# Patient Record
Sex: MALE | Race: WHITE | Employment: OTHER | URBAN - METROPOLITAN AREA
[De-identification: names, ages, dates, MRNs, and addresses within clinical notes are randomized per-mention and may not be internally consistent; named-entity substitution may affect disease eponyms.]

---

## 2020-09-09 ENCOUNTER — APPOINTMENT (OUTPATIENT)
Dept: GENERAL RADIOLOGY | Age: 85
End: 2020-09-09
Attending: EMERGENCY MEDICINE
Payer: MEDICARE

## 2020-09-09 ENCOUNTER — HOSPITAL ENCOUNTER (EMERGENCY)
Age: 85
Discharge: HOME | End: 2020-09-09
Attending: EMERGENCY MEDICINE
Payer: MEDICARE

## 2020-09-09 VITALS
WEIGHT: 191 LBS | TEMPERATURE: 98.1 F | DIASTOLIC BLOOD PRESSURE: 74 MMHG | HEIGHT: 70 IN | HEART RATE: 71 BPM | BODY MASS INDEX: 27.35 KG/M2 | SYSTOLIC BLOOD PRESSURE: 114 MMHG | RESPIRATION RATE: 16 BRPM | OXYGEN SATURATION: 96 %

## 2020-09-09 DIAGNOSIS — M25.431 PAIN AND SWELLING OF RIGHT WRIST: Primary | ICD-10-CM

## 2020-09-09 DIAGNOSIS — M25.531 PAIN AND SWELLING OF RIGHT WRIST: Primary | ICD-10-CM

## 2020-09-09 PROCEDURE — 73110 X-RAY EXAM OF WRIST: CPT

## 2020-09-09 PROCEDURE — 99284 EMERGENCY DEPT VISIT MOD MDM: CPT

## 2020-09-09 RX ORDER — PREDNISONE 20 MG/1
20 TABLET ORAL DAILY
Qty: 3 TAB | Refills: 0 | Status: SHIPPED | OUTPATIENT
Start: 2020-09-09 | End: 2020-09-14

## 2020-09-09 NOTE — PROGRESS NOTES
SW met with patient, confirmed demographic information. Patient is , lives alone, but his daughter lives down the street. Patient reports being independent at home, does not use assistive devices to ambulate, and denies any falls. Patient is also established with primary care and is seen regularly. No needs identified from SW at this time.      Jesus Leonard, [facility]    [address] [place] [address]    * [email or web address]    ( [phone]

## 2020-09-09 NOTE — ED PROVIDER NOTES
82-year-old gentleman presents with concerns about pain and swelling in his right wrist that he noticed this morning. [place] any specific injury. He says he is had no redness and no fevers. No prior history of similar symptoms. No known history of gout. No other associated symptoms. Elements of this note were created using speech recognition software. As such, errors of speech recognition may be present. Past Medical History:   Diagnosis Date    Cancer [facility])     prostate, bones, thyroid    [facility]     Diabetes ([facility]        Past Surgical History:   Procedure Laterality Date    HX HIP REPLACEMENT Right     HX THYROIDECTOMY           History reviewed. No pertinent family history. Social History     Socioeconomic History    Marital status:      Spouse name: Not on file    Number of children: Not on file    Years of education: Not on file    Highest education level: Not on file   Occupational History    Not on file   Social Needs    Financial resource strain: Not on file    Food insecurity     Worry: Not on file     Inability: Not on file    Transportation needs     Medical: Not on file     Non-medical: Not on file   Tobacco Use    Smoking status: Former Smoker    Smokeless tobacco: Never Used   Substance and Sexual Activity    Alcohol use:  Yes    Drug use: Never    Sexual activity: Not on file   Lifestyle    Physical activity     Days per week: Not on file     Minutes per session: Not on file    Stress: Not on file   Relationships    Social connections     Talks on phone: Not on file     Gets together: Not on file     Attends Roman Catholic service: Not on file     Active member of club or organization: Not on file     Attends meetings of clubs or organizations: Not on file     Relationship status: Not on file    Intimate partner violence     Fear of current or ex partner: Not on file     Emotionally abused: Not on file     Physically abused: Not on file     Forced sexual activity: Not on file   Other Topics Concern    Not on file   Social History Narrative    Not on file         ALLERGIES: Patient has no known allergies. Review of Systems   Constitutional: Negative for chills and fever. Musculoskeletal: Positive for arthralgias and joint swelling. Skin: Negative for color change and rash. Vitals:    09/09/20 1215 09/09/20 1220 09/09/20 1221 09/09/20 1224   BP: 110/62 140/69     Pulse: 60  71    Resp: 16      Temp: 98.1 °F (36.7 °C)      SpO2: 97% 96% 96% 96%   Weight:   86.6 kg (191 lb)    Height:   5' 10\" (1.778 m)             Physical Exam  Vitals signs and nursing note reviewed. Constitutional:       Appearance: Normal appearance. Musculoskeletal:         General: Swelling and tenderness present. No deformity or signs of injury. Neurological:      General: No focal deficit present. Mental Status: He is alert and oriented to person, place, and time. MDM  Number of Diagnoses or Management Options  Diagnosis management comments: I will get an x-ray of his wrist to look for occult bony abnormality. ED Course as of Sep 09 1324   Wed Sep 09, 2020   1321 X-ray shows no acute fracture. He is on Pradaxa so I will not prescribe anti-inflammatories but I will give him a prescription for some steroids.     [AC]      ED Course User Index  [AC] Keegan Davis MD       Procedures

## 2020-09-09 NOTE — ED NOTES
I have reviewed discharge instructions with the patient. The patient verbalized understanding. Patient left ED via Discharge Method: ambulatory to Home with daughter. Opportunity for questions and clarification provided. Patient given 1 scripts. To continue your aftercare when you leave the hospital, you may receive an automated call from our care team to check in on how you are doing. This is a free service and part of our promise to provide the best care and service to meet your aftercare needs.  If you have questions, or wish to unsubscribe from this service please call [phone]. Thank you for Choosing our [facility] Department.

## 2020-09-09 NOTE — ED TRIAGE NOTES
Pt c/o right hand and wrist pain that started this morning when he woke up. Pt daughter stated patient was holding a 2x4 in his right hand hammering. Pt daughter denies pt hitting his hand with hammer but that maybe the pressure caused the pain. Pt with personal mask upon arrival to the ED.

## 2021-04-18 ENCOUNTER — APPOINTMENT (OUTPATIENT)
Dept: CT IMAGING | Age: 86
DRG: 552 | End: 2021-04-18
Attending: EMERGENCY MEDICINE
Payer: MEDICARE

## 2021-04-18 ENCOUNTER — HOSPITAL ENCOUNTER (INPATIENT)
Age: 86
LOS: 1 days | Discharge: HOME | DRG: 552 | End: 2021-04-21
Attending: EMERGENCY MEDICINE | Admitting: INTERNAL MEDICINE
Payer: MEDICARE

## 2021-04-18 DIAGNOSIS — R42 DIZZINESS: Primary | ICD-10-CM

## 2021-04-18 DIAGNOSIS — R32 URINARY INCONTINENCE, UNSPECIFIED TYPE: ICD-10-CM

## 2021-04-18 DIAGNOSIS — R27.0 ATAXIA: ICD-10-CM

## 2021-04-18 PROBLEM — R26.81 UNSTEADY GAIT: Status: ACTIVE | Noted: 2021-04-18

## 2021-04-18 LAB
ANION GAP SERPL CALC-SCNC: 3 MMOL/L (ref 7–16)
APPEARANCE UR: CLEAR
ATRIAL RATE: 138 BPM
BASOPHILS # BLD: 0 K/UL (ref 0–0.2)
BASOPHILS NFR BLD: 1 % (ref 0–2)
BILIRUB UR QL: NEGATIVE
BUN SERPL-MCNC: 17 MG/DL (ref 8–23)
CALCIUM SERPL-MCNC: 9 MG/DL (ref 8.3–10.4)
CALCULATED R AXIS, ECG10: -63 DEGREES
CALCULATED T AXIS, ECG11: 3 DEGREES
CHLORIDE SERPL-SCNC: 108 MMOL/L (ref 98–107)
CO2 SERPL-SCNC: 27 MMOL/L (ref 21–32)
COLOR UR: YELLOW
CREAT SERPL-MCNC: 0.93 MG/DL (ref 0.8–1.5)
DIAGNOSIS, 93000: NORMAL
DIFFERENTIAL METHOD BLD: NORMAL
EOSINOPHIL # BLD: 0.2 K/UL (ref 0–0.8)
EOSINOPHIL NFR BLD: 3 % (ref 0.5–7.8)
ERYTHROCYTE [DISTWIDTH] IN BLOOD BY AUTOMATED COUNT: 13.2 % (ref 11.9–14.6)
GLUCOSE BLD STRIP.AUTO-MCNC: 140 MG/DL (ref 65–100)
GLUCOSE BLD STRIP.AUTO-MCNC: 157 MG/DL (ref 65–100)
GLUCOSE BLD STRIP.AUTO-MCNC: 169 MG/DL (ref 65–100)
GLUCOSE SERPL-MCNC: 153 MG/DL (ref 65–100)
GLUCOSE UR STRIP.AUTO-MCNC: >1000 MG/DL
HCT VFR BLD AUTO: 43.8 % (ref 41.1–50.3)
HGB BLD-MCNC: 14.2 G/DL (ref 13.6–17.2)
HGB UR QL STRIP: NEGATIVE
IMM GRANULOCYTES # BLD AUTO: 0 K/UL (ref 0–0.5)
IMM GRANULOCYTES NFR BLD AUTO: 1 % (ref 0–5)
INR BLD: 1.1 (ref 0.9–1.2)
INR PPP: 1.1
KETONES UR QL STRIP.AUTO: NEGATIVE MG/DL
LEUKOCYTE ESTERASE UR QL STRIP.AUTO: NEGATIVE
LYMPHOCYTES # BLD: 1 K/UL (ref 0.5–4.6)
LYMPHOCYTES NFR BLD: 19 % (ref 13–44)
MCH RBC QN AUTO: 31.3 PG (ref 26.1–32.9)
MCHC RBC AUTO-ENTMCNC: 32.4 G/DL (ref 31.4–35)
MCV RBC AUTO: 96.7 FL (ref 79.6–97.8)
MONOCYTES # BLD: 0.7 K/UL (ref 0.1–1.3)
MONOCYTES NFR BLD: 12 % (ref 4–12)
NEUTS SEG # BLD: 3.5 K/UL (ref 1.7–8.2)
NEUTS SEG NFR BLD: 65 % (ref 43–78)
NITRITE UR QL STRIP.AUTO: NEGATIVE
NRBC # BLD: 0 K/UL (ref 0–0.2)
PH UR STRIP: 6 [PH] (ref 5–9)
PLATELET # BLD AUTO: 199 K/UL (ref 150–450)
PMV BLD AUTO: 9.4 FL (ref 9.4–12.3)
POTASSIUM SERPL-SCNC: 3.8 MMOL/L (ref 3.5–5.1)
PROT UR STRIP-MCNC: NEGATIVE MG/DL
PROTHROMBIN TIME: 14.2 SEC (ref 12.5–14.7)
PT BLD: 12.8 SECS (ref 9.6–11.6)
Q-T INTERVAL, ECG07: 388 MS
QRS DURATION, ECG06: 122 MS
QTC CALCULATION (BEZET), ECG08: 455 MS
RBC # BLD AUTO: 4.53 M/UL (ref 4.23–5.6)
SERVICE CMNT-IMP: ABNORMAL
SODIUM SERPL-SCNC: 138 MMOL/L (ref 138–145)
SP GR UR REFRACTOMETRY: 1.03 (ref 1–1.02)
TROPONIN-HIGH SENSITIVITY: 30.6 PG/ML (ref 0–14)
UROBILINOGEN UR QL STRIP.AUTO: 0.2 EU/DL (ref 0.2–1)
VENTRICULAR RATE, ECG03: 83 BPM
WBC # BLD AUTO: 5.4 K/UL (ref 4.3–11.1)

## 2021-04-18 PROCEDURE — 85610 PROTHROMBIN TIME: CPT

## 2021-04-18 PROCEDURE — 84484 ASSAY OF TROPONIN QUANT: CPT

## 2021-04-18 PROCEDURE — 85025 COMPLETE CBC W/AUTO DIFF WBC: CPT

## 2021-04-18 PROCEDURE — 70450 CT HEAD/BRAIN W/O DYE: CPT

## 2021-04-18 PROCEDURE — 99218 HC RM OBSERVATION: CPT

## 2021-04-18 PROCEDURE — 74011000636 HC RX REV CODE- 636: Performed by: EMERGENCY MEDICINE

## 2021-04-18 PROCEDURE — 74011250637 HC RX REV CODE- 250/637: Performed by: EMERGENCY MEDICINE

## 2021-04-18 PROCEDURE — 74011636637 HC RX REV CODE- 636/637: Performed by: FAMILY MEDICINE

## 2021-04-18 PROCEDURE — 93005 ELECTROCARDIOGRAM TRACING: CPT | Performed by: EMERGENCY MEDICINE

## 2021-04-18 PROCEDURE — 2709999900 HC NON-CHARGEABLE SUPPLY

## 2021-04-18 PROCEDURE — 82962 GLUCOSE BLOOD TEST: CPT

## 2021-04-18 PROCEDURE — 80048 BASIC METABOLIC PNL TOTAL CA: CPT

## 2021-04-18 PROCEDURE — 81003 URINALYSIS AUTO W/O SCOPE: CPT

## 2021-04-18 PROCEDURE — 99285 EMERGENCY DEPT VISIT HI MDM: CPT

## 2021-04-18 PROCEDURE — 70498 CT ANGIOGRAPHY NECK: CPT

## 2021-04-18 PROCEDURE — 4A03X5D MEASUREMENT OF ARTERIAL FLOW, INTRACRANIAL, EXTERNAL APPROACH: ICD-10-PCS | Performed by: RADIOLOGY

## 2021-04-18 PROCEDURE — 0042T CT PERF W CBF: CPT

## 2021-04-18 PROCEDURE — 77030040393 HC DRSG OPTIFOAM GENT MDII -B

## 2021-04-18 PROCEDURE — 74011000258 HC RX REV CODE- 258: Performed by: EMERGENCY MEDICINE

## 2021-04-18 RX ORDER — DICLOFENAC SODIUM 10 MG/G
GEL TOPICAL 4 TIMES DAILY
COMMUNITY

## 2021-04-18 RX ORDER — ASCORBIC ACID 500 MG
500 TABLET ORAL
COMMUNITY

## 2021-04-18 RX ORDER — LEVOTHYROXINE SODIUM 200 UG/1
200 TABLET ORAL
COMMUNITY

## 2021-04-18 RX ORDER — LEVOTHYROXINE SODIUM 100 UG/1
200 TABLET ORAL
Status: DISCONTINUED | OUTPATIENT
Start: 2021-04-19 | End: 2021-04-21 | Stop reason: HOSPADM

## 2021-04-18 RX ORDER — FUROSEMIDE 40 MG/1
40 TABLET ORAL DAILY
Status: DISCONTINUED | OUTPATIENT
Start: 2021-04-19 | End: 2021-04-21 | Stop reason: HOSPADM

## 2021-04-18 RX ORDER — GUAIFENESIN 100 MG/5ML
81 LIQUID (ML) ORAL DAILY
Status: DISCONTINUED | OUTPATIENT
Start: 2021-04-19 | End: 2021-04-18 | Stop reason: SDUPTHER

## 2021-04-18 RX ORDER — METOPROLOL TARTRATE 50 MG/1
50 TABLET ORAL 2 TIMES DAILY
COMMUNITY

## 2021-04-18 RX ORDER — DABIGATRAN ETEXILATE 150 MG/1
150 CAPSULE ORAL EVERY 12 HOURS
Status: CANCELLED | OUTPATIENT
Start: 2021-04-18

## 2021-04-18 RX ORDER — SODIUM CHLORIDE 0.9 % (FLUSH) 0.9 %
5-40 SYRINGE (ML) INJECTION AS NEEDED
Status: DISCONTINUED | OUTPATIENT
Start: 2021-04-18 | End: 2021-04-21 | Stop reason: HOSPADM

## 2021-04-18 RX ORDER — INSULIN LISPRO 100 [IU]/ML
INJECTION, SOLUTION INTRAVENOUS; SUBCUTANEOUS
Status: DISCONTINUED | OUTPATIENT
Start: 2021-04-18 | End: 2021-04-21 | Stop reason: HOSPADM

## 2021-04-18 RX ORDER — SODIUM CHLORIDE 0.9 % (FLUSH) 0.9 %
5-40 SYRINGE (ML) INJECTION EVERY 8 HOURS
Status: DISCONTINUED | OUTPATIENT
Start: 2021-04-18 | End: 2021-04-21 | Stop reason: HOSPADM

## 2021-04-18 RX ORDER — UREA 10 %
100 LOTION (ML) TOPICAL DAILY
COMMUNITY

## 2021-04-18 RX ORDER — OXYBUTYNIN CHLORIDE 10 MG/1
10 TABLET, EXTENDED RELEASE ORAL DAILY
COMMUNITY

## 2021-04-18 RX ORDER — ASCORBIC ACID 500 MG
500 TABLET ORAL DAILY
Status: DISCONTINUED | OUTPATIENT
Start: 2021-04-19 | End: 2021-04-21 | Stop reason: HOSPADM

## 2021-04-18 RX ORDER — SODIUM CHLORIDE 0.9 % (FLUSH) 0.9 %
10 SYRINGE (ML) INJECTION
Status: COMPLETED | OUTPATIENT
Start: 2021-04-18 | End: 2021-04-18

## 2021-04-18 RX ORDER — TURM/GING/BOS/YUC/WIL/CHAM/HOR 100-100 MG
TABLET ORAL
COMMUNITY

## 2021-04-18 RX ORDER — GUAIFENESIN 100 MG/5ML
81 LIQUID (ML) ORAL DAILY
Status: DISCONTINUED | OUTPATIENT
Start: 2021-04-18 | End: 2021-04-21 | Stop reason: HOSPADM

## 2021-04-18 RX ORDER — LABETALOL HYDROCHLORIDE 5 MG/ML
5 INJECTION, SOLUTION INTRAVENOUS
Status: DISCONTINUED | OUTPATIENT
Start: 2021-04-18 | End: 2021-04-21 | Stop reason: HOSPADM

## 2021-04-18 RX ORDER — ATORVASTATIN CALCIUM 40 MG/1
40 TABLET, FILM COATED ORAL DAILY
Status: DISCONTINUED | OUTPATIENT
Start: 2021-04-19 | End: 2021-04-21 | Stop reason: HOSPADM

## 2021-04-18 RX ORDER — BENAZEPRIL HYDROCHLORIDE 20 MG/1
20 TABLET ORAL DAILY
COMMUNITY
End: 2021-04-21

## 2021-04-18 RX ORDER — ATORVASTATIN CALCIUM 40 MG/1
40 TABLET, FILM COATED ORAL DAILY
COMMUNITY

## 2021-04-18 RX ORDER — FUROSEMIDE 40 MG/1
40 TABLET ORAL DAILY
COMMUNITY

## 2021-04-18 RX ORDER — DABIGATRAN ETEXILATE 150 MG/1
150 CAPSULE ORAL EVERY 12 HOURS
COMMUNITY

## 2021-04-18 RX ORDER — GLIPIZIDE 2.5 MG/1
2.5 TABLET, EXTENDED RELEASE ORAL DAILY
COMMUNITY

## 2021-04-18 RX ORDER — METFORMIN HYDROCHLORIDE 500 MG/1
500 TABLET ORAL 2 TIMES DAILY WITH MEALS
COMMUNITY

## 2021-04-18 RX ORDER — MULTIVIT WITH IRON,MINERALS
TABLET,CHEWABLE ORAL
COMMUNITY

## 2021-04-18 RX ORDER — DILTIAZEM HYDROCHLORIDE 240 MG/1
240 CAPSULE, EXTENDED RELEASE ORAL DAILY
COMMUNITY

## 2021-04-18 RX ADMIN — INSULIN LISPRO 2 UNITS: 100 INJECTION, SOLUTION INTRAVENOUS; SUBCUTANEOUS at 17:56

## 2021-04-18 RX ADMIN — ASPIRIN 81 MG: 81 TABLET, CHEWABLE ORAL at 12:34

## 2021-04-18 RX ADMIN — INSULIN LISPRO 2 UNITS: 100 INJECTION, SOLUTION INTRAVENOUS; SUBCUTANEOUS at 21:43

## 2021-04-18 RX ADMIN — SODIUM CHLORIDE 100 ML: 900 INJECTION, SOLUTION INTRAVENOUS at 10:00

## 2021-04-18 RX ADMIN — IOPAMIDOL 100 ML: 755 INJECTION, SOLUTION INTRAVENOUS at 10:00

## 2021-04-18 RX ADMIN — Medication 10 ML: at 21:43

## 2021-04-18 RX ADMIN — Medication 10 ML: at 13:29

## 2021-04-18 RX ADMIN — Medication 10 ML: at 10:00

## 2021-04-18 NOTE — PROGRESS NOTES
Spiritual assessment:      PREFERRED NAME:  GAYATRI    DAUGHTER - MAMADOU    [place]  NO HX OF  VISITS    HX CANCER MULTIPLE SITES WITH METS    BLURRY VISION PRESENTLY    HIGH RISK        Will continue to assess how we can best serve this family.

## 2021-04-18 NOTE — PROGRESS NOTES
04/18/21 1300   Dual Skin Pressure Injury Assessment   Dual Skin Pressure Injury Assessment WDL   Second Care Provider (Based on [address] [place] [address]) Jamel Hutton RN     No pressure injuries noted. Oriented to room and call bell system. Bed low and locked, alarm on, and call bell in reach. Reminded pt to call for any needs, pt verbalized understanding.

## 2021-04-18 NOTE — ED PROVIDER NOTES
72-year-old male with a history of prostate cancer and prostatectomy, atrial fibrillation on Pradaxa, congestive heart failure, diabetes presents with sudden onset dizziness and unsteady gait when he woke up to urinate at 4 AM.  He was normal upon going to bed last night 11 PM.  He reports multiple episodes of urinary incontinence. Denies burning with urination. Also reports blurry vision for the past several days with excessive sleepiness. No headache, chest pain, or shortness of breath. No recent changes in medications. No nausea or vomiting. Patient is falling more to the right. Dizziness  Pertinent negatives include no shortness of breath, no chest pain, no vomiting, no confusion, no headaches and no nausea. Past Medical History:   Diagnosis Date    Cancer [facility])     prostate, bones, thyroid    [facility]     [facility]        Past Surgical History:   Procedure Laterality Date    HX HIP REPLACEMENT Right     HX THYROIDECTOMY           No family history on file. Social History     Socioeconomic History    Marital status:      Spouse name: Not on file    Number of children: Not on file    Years of education: Not on file    Highest education level: Not on file   Occupational History    Not on file   Social Needs    Financial resource strain: Not on file    Food insecurity     Worry: Not on file     Inability: Not on file    Transportation needs     Medical: Not on file     Non-medical: Not on file   Tobacco Use    Smoking status: Former Smoker    Smokeless tobacco: Never Used   Substance and Sexual Activity    Alcohol use:  Yes    Drug use: Never    Sexual activity: Not on file   Lifestyle    Physical activity     Days per week: Not on file     Minutes per session: Not on file    Stress: Not on file   Relationships    Social connections     Talks on phone: Not on file     Gets together: Not on file     Attends Judaism service: Not on file     Active member of club or organization: Not on file     Attends meetings of clubs or organizations: Not on file     Relationship status: Not on file    Intimate partner violence     Fear of current or ex partner: Not on file     Emotionally abused: Not on file     Physically abused: Not on file     Forced sexual activity: Not on file   Other Topics Concern    Not on file   Social History Narrative    Not on file         ALLERGIES: Patient has no known allergies. Review of Systems   Constitutional: Negative for fever. HENT: Negative for hearing loss. Eyes: Positive for visual disturbance. Respiratory: Negative for cough and shortness of breath. Cardiovascular: Negative for chest pain. Gastrointestinal: Negative for abdominal pain, diarrhea, nausea and vomiting. Genitourinary: Positive for difficulty urinating. Musculoskeletal: Negative for back pain. Skin: Negative for rash. Neurological: Positive for dizziness and weakness. Negative for syncope and headaches. Psychiatric/Behavioral: Negative for confusion. All other systems reviewed and are negative. Vitals:    04/18/21 0939   BP: (!) 143/93   Pulse: 65   Resp: 16   Temp: 97.7 °F (36.5 °C)   SpO2: 97%   Weight: 90.7 kg (200 lb)   Height: 5' 10\" (1.778 m)            Physical Exam  Vitals signs and nursing note reviewed. Constitutional:       Appearance: Normal appearance. He is well-developed. HENT:      Head: Normocephalic and atraumatic. Nose: Nose normal.      Mouth/Throat:      Mouth: Mucous membranes are moist.   Eyes:      Pupils: Pupils are equal, round, and reactive to light. Neck:      Musculoskeletal: Normal range of motion and neck supple. Cardiovascular:      Rate and Rhythm: Rhythm irregular. Heart sounds: Normal heart sounds. Pulmonary:      Effort: Pulmonary effort is normal.      Breath sounds: Normal breath sounds. Abdominal:      Palpations: Abdomen is soft. Tenderness:  There is no abdominal tenderness. Musculoskeletal: Normal range of motion. General: No deformity. Skin:     General: Skin is warm and dry. Neurological:      General: No focal deficit present. Mental Status: He is alert and oriented to person, place, and time. Mental status is at baseline. Cranial Nerves: Cranial nerves are intact. Sensory: Sensation is intact. Motor: Motor function is intact. Coordination: Finger-Nose-Finger Test normal. Impaired rapid alternating movements. Gait: Gait abnormal.   Psychiatric:         Mood and Affect: Mood normal.         Behavior: Behavior normal.          MDM  Number of Diagnoses or Management Options  Diagnosis management comments: Parts of this document were created using dragon voice recognition software. The chart has been reviewed but errors may still be present. I wore appropriate PPE throughout this patient's ED visit. Kay Leyden, MD, 10:19 AM    Code stroke called. NOT TPA candidate due to onset 2300 last night    10:37 AM  Evaluated by teleneurology. Advised baby ASA. Will get MRI brain and add T/L spine due to new onset urinary incontinence with unsteady gait and history of known metastatic prostate cancer. This could signify spinal lesions. Will dw hospitalist for admission.        Amount and/or Complexity of Data Reviewed  Clinical lab tests: ordered and reviewed (Results for orders placed or performed during the hospital encounter of 04/18/21  -CBC WITH AUTOMATED DIFF       Result                      Value             Ref Range           WBC                         5.4               4.3 - 11.1 K*       RBC                         4.53              4.23 - 5.6 M*       HGB                         14.2              13.6 - 17.2 *       HCT                         43.8              41.1 - 50.3 %       MCV                         96.7              79.6 - 97.8 *       MCH                         31.3              26.1 - 32.9 * MCHC                        32.4              31.4 - 35.0 *       RDW                         13.2              11.9 - 14.6 %       PLATELET                    199               150 - 450 K/*       MPV                         9.4               9.4 - 12.3 FL       ABSOLUTE NRBC               0.00              0.0 - 0.2 K/*       DF                          AUTOMATED                             NEUTROPHILS                 65                43 - 78 %           LYMPHOCYTES                 19                13 - 44 %           MONOCYTES                   12                4.0 - 12.0 %        EOSINOPHILS                 3                 0.5 - 7.8 %         BASOPHILS                   1                 0.0 - 2.0 %         IMMATURE GRANULOCYTES       1                 0.0 - 5.0 %         ABS. NEUTROPHILS            3.5               1.7 - 8.2 K/*       ABS. LYMPHOCYTES            1.0               0.5 - 4.6 K/*       ABS. MONOCYTES              0.7               0.1 - 1.3 K/*       ABS. EOSINOPHILS            0.2               0.0 - 0.8 K/*       ABS. BASOPHILS              0.0               0.0 - 0.2 K/*       ABS. IMM.  GRANS.            0.0               0.0 - 0.5 K/*  -METABOLIC PANEL, BASIC       Result                      Value             Ref Range           Sodium                      138               138 - 145 mm*       Potassium                   3.8               3.5 - 5.1 mm*       Chloride                    108 (H)           98 - 107 mmo*       CO2                         27                21 - 32 mmol*       Anion gap                   3 (L)             7 - 16 mmol/L       Glucose                     153 (H)           65 - 100 mg/*       BUN                         17                8 - 23 MG/DL        Creatinine                  0.93              0.8 - 1.5 MG*       GFR est AA                  >60               >60 ml/min/1*       GFR est non-AA              >60               >60 ml/min/1*       Calcium 9.0               8.3 - 10.4 M*  -PROTHROMBIN TIME + INR       Result                      Value             Ref Range           Prothrombin time            14.2              12.5 - 14.7 *       INR                         1.1                              -TROPONIN-HIGH SENSITIVITY       Result                      Value             Ref Range           Troponin-High Sensitiv*     30.6 (H)          0 - 14 pg/mL   -POC PT/INR       Result                      Value             Ref Range           Prothrombin time (POC)      12.8 (H)          9.6 - 11.6 S*       INR (POC)                   1.1               0.9 - 1.2      -GLUCOSE, POC       Result                      Value             Ref Range           Glucose (POC)               140 (H)           65 - 100 mg/*       Performed by                                                  Isha  -EKG, 12 LEAD, INITIAL       Result                      Value             Ref Range           Ventricular Rate            83                BPM                 Atrial Rate                 138               BPM                 QRS Duration                122               ms                  Q-T Interval                388               ms                  QTC Calculation (Bezet)     455               ms                  Calculated R Axis           -63               degrees             Calculated T Axis           3                 degrees             Diagnosis                                                     !! AGE AND GENDER SPECIFIC ECG ANALYSIS !! Atrial fibrillation with premature ventricular or aberrantly conducted    complexes   Right bundle branch block   Left anterior fascicular block   !!! Bifascicular block !!!    Abnormal ECG   No previous ECGs available     )  Tests in the radiology section of CPT®: ordered and reviewed (Ct Perf W Cbf    Result Date: 4/18/2021  HISTORY:  21-year-old male with weakness, unsteadiness, and leaning to the right EXAM/TECHNIQUE: CT Perfusion Imaging. CT perfusion imaging of the brain was performed after the administration of intravenous contrast.  Perfusion maps and perfusion analysis output were generated using the [employer] perfusion processing software algorithm. Radiation dose reduction techniques were used for this study: All CT scans performed at this facility use one or all of the following: Automated exposure control, adjustment of the mA and/or kVp according to patient's size, iterative reconstruction. COMPARISON: Noncontrast CT head exam on 4/18/2021. FINDINGS: [email or web address] Output Values: CBF < 30% volume:  0 ml   (core infarction volume greater than 50 cc associated with poor outcomes). Tmax > 6 seconds: 0 ml Tmax/CBF Mismatch Volume: 0 ml Tmax/CBF Mismatch Ratio: Not applicable Tmax > 10 seconds Volume: 0 ml   (volume greater than 100 mL is associated with poor outcome) Hypoperfusion Intensity Ratio (Tmax > 10 seconds/Tmax > 6 seconds): 0   (values greater than 0.5 associated with poor outcome)     No CT evidence of acute perfusion abnormality. Ct Code Neuro Head Wo Contrast    Result Date: 4/18/2021  CT of the head without contrast. CLINICAL INDICATION: Weakness, unsteadiness, leaning to the right, code stroke PROCEDURE: Serial thin section axial images are obtained from the cranial vertex through the skull base without the administration of intravenous contrast. Radiation dose reduction techniques were used for this study. Our CT scanners use one or all of the following: Automated exposure control, adjusted of the mA and/or kV according to patient size, iterative reconstruction COMPARISON: No prior. FINDINGS: There is no acute intracranial hemorrhage, mass, or mass effect. No abnormal extra-axial fluid collections identified. There is no hydrocephalus. The basilar cisterns are widely patent. Mild periventricular white matter hypoattenuation is noted.  Otherwise, the gray-white matter brain parenchymal interface is well-maintained. No findings present to indicate large, cortical-based territorial infarction. No skull fracture or aggressive osseous lesion noted. The mastoid air cells and included paranasal sinuses are clear. 1. No acute intracranial abnormality.  2. Mild chronic white matter microangiopathic changes.     )  Tests in the medicine section of CPT®: ordered and reviewed           EKG    Date/Time: 4/18/2021 10:20 AM  Performed by: Kayla Camarena MD  Authorized by: Kayla Camarena MD     ECG reviewed by ED Physician in the absence of a cardiologist: yes    Interpretation:     Interpretation: abnormal    Rate:     ECG rate:  83    ECG rate assessment: normal    Rhythm:     Rhythm: atrial fibrillation    Ectopy:     Ectopy: PVCs    Conduction:     Conduction: abnormal      Abnormal conduction: complete RBBB and bifascicular block      Critical Care  Performed by: Kayla Camarena MD  Authorized by: Kayla Camarena MD     Critical care provider statement:     Critical care time (minutes):  30    Critical care time was exclusive of:  Separately billable procedures and treating other patients    Critical care was necessary to treat or prevent imminent or life-threatening deterioration of the following conditions:  CNS failure or compromise    Critical care was time spent personally by me on the following activities:  Blood draw for specimens, development of treatment plan with patient or surrogate, discussions with consultants, evaluation of patient's response to treatment, examination of patient, ordering and performing treatments and interventions, ordering and review of laboratory studies, ordering and review of radiographic studies, pulse oximetry, re-evaluation of patient's condition and review of old charts    I assumed direction of critical care for this patient from another provider in my specialty: yes

## 2021-04-18 NOTE — H&P
[facility] Hospitalist Note     Admit Date:  2021  9:35 AM   Name:  Ann Marie Chaudhary   Age:  82 y.o.  :  1934   MRN:  [id number]   PCP:  Eryn Mendez MD  Treatment Team: Attending Provider: Uche Osorio MD; Primary Nurse: Jac Mccray RN    HPI/Subjective:   Patient is a 44-year-old male with history of diabetes mellitus, CHF, A. fib on Pradaxa, history of prostate cancer sacral mets, history of thyroid and liver cancer which are in remission presented to the ED with sudden onset unsteady gait, dizziness and urinary incontinence. The patient was last normal at 11 PM before going to bed. He woke up at 4 AM and found himself wobbling, unsteady, dizzy and mostly falling to his right side. Also has urinary incontinence. He reports multiple episodes of urinary incontinence. Denies any dysuria, fevers or chills. Denies any extremity weakness, numbness, headache, slurred speech, facial droop. Denies any previous history of similar symptoms. He is following oncology/urology for the prostate cancer. Denies any chest pain, palpitation, nausea, abdominal pain. On arrival, code stroke called in the ED. CT head negative for acute pathology. He is not a TPA candidate due to out of window and he is anticoagulated with Pradaxa. Patient was evaluated by telemetry neuro. Hospitalist consulted for admission.     Assessment and Plan:     CVA work-up for ataxia and urinary incontinence:  Differential: Acute stroke versus spinal etiology given history of known prostate cancer with sacral metastasis  CT head with acute pathology  CTA head and neck negative for major intracranial arterial stenosis or occlusion  MRI brain without contrast  MRI T/L-spine with contrast acute new onset urinary incontinence with ataxia and history of known metastatic prostate cancer  Echocardiogram  Neuro consulted, appreciate recommendation  Continue aspirin and statin  Lipid panel and A1c  Permissive hypertension for 24-hour, labetalol IV as needed for blood pressure above 220/120  We'll hold Pradaxa for now, will resume if stroke excluded  SLP/PT/OT  Stroke coordinator eval  Start on cardiac diet if patient passes bedside swallow test    Hypertension/CHF/A. Fib:  We'll hold antihypertensive to allow permissive hypertension for 24 hours  Labetalol IV as needed for blood pressure above 220/120  Continue aspirin, statin  Hold Pradaxa for now, will resume if stroke excluded    Diabetes mellitus:  Hold oral hypoglycemic  Start patient on SSI and will adjust insulin as needed    Prostate cancer with sacral mets:  MRI T/L-spine  Outpatient follow-up    Discharge planning: Admit patient to the remote telemetry    DVT ppx ordered  Code status:  Full  Estimated LOS:  Greater than 2 midnights  Risk:  high    Hospital Problems as of 4/18/2021 Never Reviewed          Codes Class Noted - Resolved POA    Urinary incontinence ICD-10-CM: R32  ICD-9-CM: 788.30  4/18/2021 - Present Unknown        Unsteady gait ICD-10-CM: R26.81  ICD-9-CM: 781.2  4/18/2021 - Present Unknown                10 systems reviewed and negative except as noted in HPI. Past Medical History:   Diagnosis Date    Cancer [facility])     prostate, bones, thyroid    CHF (congestive heart failure) (HCC)     Diabetes ([address]       Past Surgical History:   Procedure Laterality Date    HX HIP REPLACEMENT Right     HX THYROIDECTOMY        No Known Allergies   Social History     Tobacco Use    Smoking status: Former Smoker    Smokeless tobacco: Never Used   Substance Use Topics    Alcohol use: Yes      No family history on file. Family history reviewed and noncontributory. There is no immunization history on file for this patient. PTA Medications:  Prior to Admission Medications   Prescriptions Last Dose Informant Patient Reported? Taking? SITagliptin (JANUVIA) 50 mg tablet   Yes Yes   Sig: Take 50 mg by mouth daily.    ascorbic acid, vitamin C, (VITAMIN C) 500 mg tablet   Yes Yes Sig: Take 500 mg by mouth. atorvastatin (LIPITOR) 40 mg tablet   Yes Yes   Sig: Take 40 mg by mouth daily. benazepriL (LOTENSIN) 20 mg tablet   Yes Yes   Sig: Take 20 mg by mouth daily. cyanocobalamin (VITAMIN B12) 100 mcg tablet   Yes Yes   Sig: Take 100 mcg by mouth daily. dabigatran etexilate (PRADAXA) 150 mg capsule   Yes Yes   Sig: Take 150 mg by mouth every twelve (12) hours. diclofenac (Voltaren) 1 % gel   Yes Yes   Sig: Apply  to affected area four (4) times daily. dilTIAZem ER (TIAZAC) 240 mg capsule   Yes Yes   Sig: Take 240 mg by mouth daily. empagliflozin (JARDIANCE) 25 mg tablet   Yes Yes   Sig: Take 25 mg by mouth daily. enzalutamide (XTANDI) 40 mg capsule   Yes Yes   Sig: Take 160 mg by mouth daily. furosemide (LASIX) 40 mg tablet   Yes Yes   Sig: Take 40 mg by mouth daily. glipiZIDE SR (GLUCOTROL XL) 2.5 mg CR tablet   Yes Yes   Sig: Take 2.5 mg by mouth daily. glucosamine-chondroitin (Osteo Bi-Flex) 250-200 mg tab   Yes Yes   Sig: Take  by mouth. glucosamine/chondr degroot A sod (OSTEO BI-FLEX PO)   Yes Yes   Sig: Take  by mouth.   levothyroxine (SYNTHROID) 200 mcg tablet   Yes Yes   Sig: Take 200 mcg by mouth Daily (before breakfast). metFORMIN (GLUCOPHAGE) 500 mg tablet   Yes Yes   Sig: Take 500 mg by mouth two (2) times daily (with meals). metoprolol tartrate (LOPRESSOR) 50 mg tablet   Yes Yes   Sig: Take 50 mg by mouth two (2) times a day. multivitamin, tx-iron-ca-min (THERA-M w/ IRON) 9 mg iron-400 mcg tab tablet   Yes Yes   Sig: Take 1 Tab by mouth daily. oxybutynin chloride XL (DITROPAN XL) 10 mg CR tablet   Yes Yes   Sig: Take 10 mg by mouth daily. ywwi-erno-ybv-aub-scy-gjkg-hor (Tumersaid) [phone] mg tab   Yes Yes   Sig: Take  by mouth.       Facility-Administered Medications: None       Objective:     Patient Vitals for the past 24 hrs:   Temp Pulse Resp BP SpO2   04/18/21 0939 97.7 °F (36.5 °C) 65 16 (!) 143/93 97 %     Oxygen Therapy  O2 Sat (%): 97 % (04/18/21 0939)  O2 Device: None (Room air) (04/18/21 0939)    Estimated body mass index is 28.7 kg/m² as calculated from the following:    Height as of this encounter: 5' 10\" (1.778 m). Weight as of this encounter: 90.7 kg (200 lb). No intake or output data in the 24 hours ending 04/18/21 1213    *Note that automatically entered I/Os may not be accurate; dependent on patient compliance with collection and accurate  by assistants. Physical Exam:  General:    Well nourished. Alert. Eyes:   Normal sclerae. Extraocular movements intact. HENT:  Normocephalic, atraumatic. Moist mucous membranes  CV:   Irregularly irregular rhythm  Lungs:  CTAB. No wheezing, rhonchi, or rales. Abdomen: Soft, nontender, nondistended. Extremities: Warm and dry. No cyanosis or edema. Neurologic: CN II-XII grossly intact. Sensation intact. Motor upper and lower extremity 5/5, gait abnormal, leaning more towards right  Skin:     No rashes or jaundice. Normal coloration  Psych:  Normal mood and affect. I reviewed the labs, imaging, EKGs, telemetry, and other studies done this admission. Data Reviewed:   Recent Results (from the past 24 hour(s))   GLUCOSE, POC    Collection Time: 04/18/21  9:37 AM   Result Value Ref Range    Glucose (POC) 140 (H) 65 - 100 mg/dL    Performed by Isha    EKG, 12 LEAD, INITIAL    Collection Time: 04/18/21  9:37 AM   Result Value Ref Range    Ventricular Rate 83 BPM    Atrial Rate 138 BPM    QRS Duration 122 ms    Q-T Interval 388 ms    QTC Calculation (Bezet) 455 ms    Calculated R Axis -63 degrees    Calculated T Axis 3 degrees    Diagnosis       !! AGE AND GENDER SPECIFIC ECG ANALYSIS !! Atrial fibrillation with premature ventricular or aberrantly conducted   complexes  Right bundle branch block  Left anterior fascicular block  !!! Bifascicular block !!!   Abnormal ECG  No previous ECGs available  Confirmed by RUBEN PULLIAM (), SONY BENAVIDES ([id number]) on 4/18/2021 10:54:46 AM     CBC WITH AUTOMATED DIFF    Collection Time: 04/18/21  9:39 AM   Result Value Ref Range    WBC 5.4 4.3 - 11.1 K/uL    RBC 4.53 4.23 - 5.6 M/uL    HGB 14.2 13.6 - 17.2 g/dL    HCT 43.8 41.1 - 50.3 %    MCV 96.7 79.6 - 97.8 FL    MCH 31.3 26.1 - 32.9 PG    MCHC 32.4 31.4 - 35.0 g/dL    RDW 13.2 11.9 - 14.6 %    PLATELET 224 916 - 385 K/uL    MPV 9.4 9.4 - 12.3 FL    ABSOLUTE NRBC 0.00 0.0 - 0.2 K/uL    DF AUTOMATED      NEUTROPHILS 65 43 - 78 %    LYMPHOCYTES 19 13 - 44 %    MONOCYTES 12 4.0 - 12.0 %    EOSINOPHILS 3 0.5 - 7.8 %    BASOPHILS 1 0.0 - 2.0 %    IMMATURE GRANULOCYTES 1 0.0 - 5.0 %    ABS. NEUTROPHILS 3.5 1.7 - 8.2 K/UL    ABS. LYMPHOCYTES 1.0 0.5 - 4.6 K/UL    ABS. MONOCYTES 0.7 0.1 - 1.3 K/UL    ABS. EOSINOPHILS 0.2 0.0 - 0.8 K/UL    ABS. BASOPHILS 0.0 0.0 - 0.2 K/UL    ABS. IMM.  GRANS. 0.0 0.0 - 0.5 K/UL   METABOLIC PANEL, BASIC    Collection Time: 04/18/21  9:39 AM   Result Value Ref Range    Sodium 138 138 - 145 mmol/L    Potassium 3.8 3.5 - 5.1 mmol/L    Chloride 108 (H) 98 - 107 mmol/L    CO2 27 21 - 32 mmol/L    Anion gap 3 (L) 7 - 16 mmol/L    Glucose 153 (H) 65 - 100 mg/dL    BUN 17 8 - 23 MG/DL    Creatinine 0.93 0.8 - 1.5 MG/DL    GFR est AA >60 >60 ml/min/1.73m2    GFR est non-AA >60 >60 ml/min/1.73m2    Calcium 9.0 8.3 - 10.4 MG/DL   PROTHROMBIN TIME + INR    Collection Time: 04/18/21  9:39 AM   Result Value Ref Range    Prothrombin time 14.2 12.5 - 14.7 sec    INR 1.1     TROPONIN-HIGH SENSITIVITY    Collection Time: 04/18/21  9:39 AM   Result Value Ref Range    Troponin-High Sensitivity 30.6 (H) 0 - 14 pg/mL   POC PT/INR    Collection Time: 04/18/21  9:39 AM   Result Value Ref Range    Prothrombin time (POC) 12.8 (H) 9.6 - 11.6 SECS    INR (POC) 1.1 0.9 - 1.2         All Micro Results     None          Current Facility-Administered Medications   Medication Dose Route Frequency    aspirin chewable tablet 81 mg  81 mg Oral DAILY     Current Outpatient Medications   Medication Sig    dabigatran etexilate (PRADAXA) 150 mg capsule Take 150 mg by mouth every twelve (12) hours.  oxybutynin chloride XL (DITROPAN XL) 10 mg CR tablet Take 10 mg by mouth daily.  empagliflozin (JARDIANCE) 25 mg tablet Take 25 mg by mouth daily.  furosemide (LASIX) 40 mg tablet Take 40 mg by mouth daily.  metFORMIN (GLUCOPHAGE) 500 mg tablet Take 500 mg by mouth two (2) times daily (with meals).  metoprolol tartrate (LOPRESSOR) 50 mg tablet Take 50 mg by mouth two (2) times a day.  benazepriL (LOTENSIN) 20 mg tablet Take 20 mg by mouth daily.  enzalutamide (XTANDI) 40 mg capsule Take 160 mg by mouth daily.  multivitamin, tx-iron-ca-min (THERA-M w/ IRON) 9 mg iron-400 mcg tab tablet Take 1 Tab by mouth daily.  glucosamine/chondr degroot A sod (OSTEO BI-FLEX PO) Take  by mouth.  glucosamine-chondroitin (Osteo Bi-Flex) 250-200 mg tab Take  by mouth.  diclofenac (Voltaren) 1 % gel Apply  to affected area four (4) times daily.  atorvastatin (LIPITOR) 40 mg tablet Take 40 mg by mouth daily.  dilTIAZem ER (TIAZAC) 240 mg capsule Take 240 mg by mouth daily.  glipiZIDE SR (GLUCOTROL XL) 2.5 mg CR tablet Take 2.5 mg by mouth daily.  SITagliptin (JANUVIA) 50 mg tablet Take 50 mg by mouth daily.  levothyroxine (SYNTHROID) 200 mcg tablet Take 200 mcg by mouth Daily (before breakfast).  azvr-icxy-mpu-atc-sfh-bjcp-hor (Tumersaid) [phone] mg tab Take  by mouth.  cyanocobalamin (VITAMIN B12) 100 mcg tablet Take 100 mcg by mouth daily.  ascorbic acid, vitamin C, (VITAMIN C) 500 mg tablet Take 500 mg by mouth. Other Studies:  No results found for this visit on 04/18/21. Ct Perf W Cbf    Result Date: 4/18/2021  HISTORY:  62-year-old male with weakness, unsteadiness, and leaning to the right EXAM/TECHNIQUE: CT Perfusion Imaging.  CT perfusion imaging of the brain was performed after the administration of intravenous contrast.  Perfusion maps and perfusion analysis output were generated using the [employer] perfusion processing software algorithm. Radiation dose reduction techniques were used for this study: All CT scans performed at this facility use one or all of the following: Automated exposure control, adjustment of the mA and/or kVp according to patient's size, iterative reconstruction. COMPARISON: Noncontrast CT head exam on 4/18/2021. FINDINGS: [email or web address] Output Values: CBF < 30% volume:  0 ml   (core infarction volume greater than 50 cc associated with poor outcomes). Tmax > 6 seconds: 0 ml Tmax/CBF Mismatch Volume: 0 ml Tmax/CBF Mismatch Ratio: Not applicable Tmax > 10 seconds Volume: 0 ml   (volume greater than 100 mL is associated with poor outcome) Hypoperfusion Intensity Ratio (Tmax > 10 seconds/Tmax > 6 seconds): 0   (values greater than 0.5 associated with poor outcome)     No CT evidence of acute perfusion abnormality. Cta Code Neuro Head And Neck W Cont    Result Date: 4/18/2021  HISTORY: 82years of age Male with weakness and unsteadiness. EXAM: CTA CODE NEURO HEAD AND NECK W CONT. Radiation reduction dose techniques were used for the study. Our CT scanner use one or all of the following- Automated exposure control, adjustment of the mA and/or KV according the patient size, iterative reconstruction. 3-D multiplanar reformations were performed at the workstation. All carotid artery stenosis percentages are based upon NASCET criteria if appropriate. Per the CT technologist, this study was analyzed by the [address] ai algorithm. COMPARISON: No prior vascular imaging of the head or neck available. Noncontrast CT head and CT perfusion exams on 4/18/2021. FINDINGS: VASCULAR FINDINGS: Cervical CTA: There is a three-vessel branching pattern of the aortic arch. Mild atherosclerotic disease of the aortic arch. The right subclavian artery is patent where visualized. The left subclavian artery is patent where visualized.  The right common carotid artery is patent without stenosis. Right external carotid artery is patent. No significant atherosclerotic disease or stenosis of the right carotid bulb or proximal ICA. Cervical right ICA is diffusely patent. Vertebral arteries are codominant. Cervical right vertebral artery is diffusely patent. Left common carotid artery is patent. The left external carotid artery is patent. No significant atherosclerotic disease or stenosis of the left carotid bulb or proximal ICA. Cervical left ICA is diffusely patent. Cervical left vertebral artery is diffusely patent. No significant stenosis or occlusion in the major cervical arterial vasculature. Cranial CTA: Intracranial right internal carotid artery is patent. Intracranial left internal carotid artery is patent. There is atherosclerotic calcification of the bilateral cavernous segments without significant stenosis. Right and left A1 segments are patent. Bilateral A2 and A3 segments are patent. Right M1 is patent. Major proximal right MCA branches beyond the bifurcation are patent. Left M1 is patent. Major proximal left MCA branches beyond the bifurcation are patent. Intracranial right and left vertebral arteries are patent. Atherosclerotic calcification at the level of the foramen magnum in both arteries without significant stenosis. Basilar artery is patent. Right posterior cerebral artery is patent. Left posterior cerebral artery is patent. No evidence of intracranial aneurysms. No proximal vessel occlusion. Major dural venous sinuses are not well evaluated but appear to be grossly patent. NONVASCULAR FINDINGS: Head: There is no evidence of intracranial enhancing masses or focal fluid collections. There are chronic ischemic white matter demyelination changes. Oral structures demonstrate findings of lens replacements. Calvarial and maxillofacial soft tissues are without evidence of significant acute abnormality. Neck: Thyroid gland is aplastic versus surgically absent.  No evidence of significant cervical or upper thoracic adenopathy. The visualized upper lungs are without areas of prominent focal consolidation or masses. OSSEOUS STRUCTURES: Mastoid air cells are well aerated. Paranasal sinuses demonstrate mucoperiosteal thickening/irregular secretion in the right maxillary sinus. Minimal mucoperiosteal thickening in anterior ethmoid air cells bilaterally. There are are some multilevel degenerative changes of the cervical spine as well as a visualized upper thoracic spine, likely age-related. .     1. Negative CTA exam of the major cervical arterial vasculature for significant stenosis or occlusion. 2. Negative CTA exam of the major intracranial arterial vasculature for significant proximal vessel stenosis, occlusion, or aneurysms. 3. Right maxillary sinus disease, as above. Ct Code Neuro Head Wo Contrast    Result Date: 4/18/2021  CT of the head without contrast. CLINICAL INDICATION: Weakness, unsteadiness, leaning to the right, code stroke PROCEDURE: Serial thin section axial images are obtained from the cranial vertex through the skull base without the administration of intravenous contrast. Radiation dose reduction techniques were used for this study. Our CT scanners use one or all of the following: Automated exposure control, adjusted of the mA and/or kV according to patient size, iterative reconstruction COMPARISON: No prior. FINDINGS: There is no acute intracranial hemorrhage, mass, or mass effect. No abnormal extra-axial fluid collections identified. There is no hydrocephalus. The basilar cisterns are widely patent. Mild periventricular white matter hypoattenuation is noted. Otherwise, the gray-white matter brain parenchymal interface is well-maintained. No findings present to indicate large, cortical-based territorial infarction. No skull fracture or aggressive osseous lesion noted. The mastoid air cells and included paranasal sinuses are clear. 1. No acute intracranial abnormality.  2. Mild chronic white matter microangiopathic changes. SARS-CoV-2 Lab Results  \"Novel Coronavirus\" Test: No results found for: COV2NT   \"Emergent Disease\" Test: No results found for: EDPR  \"SARS-COV-2\" Test: No results found for: XGCOVT  Rapid Test: No results found for: COVR            Part of this note was written by using a voice dictation software and the note has been proof read but may still contain some grammatical/other typographical errors.     Signed:  David Wilson MD

## 2021-04-18 NOTE — ED NOTES
TRANSFER - OUT REPORT:    Verbal report given to Ignacio Kim RN on Dangelo Greenberg  being transferred to [address] [place] [address] for routine progression of care       Report consisted of patients Situation, Background, Assessment and   Recommendations(SBAR). Information from the following report(s) SBAR was reviewed with the receiving nurse. Lines:   Peripheral IV 04/18/21 Right Antecubital (Active)        Opportunity for questions and clarification was provided.       Patient transported with:   [employer]

## 2021-04-18 NOTE — ED TRIAGE NOTES
Pt arrived POV with c/o weakness. Pt reports increased frequency and urgency. Pt reports feeling unsteady and leaning to the right and nystagmus noted to right eye. Pt reports when he woke up at 4 am to use the bathroom he was unsteady. Pt denies sx when he went to bed around 2300.

## 2021-04-19 ENCOUNTER — APPOINTMENT (OUTPATIENT)
Dept: MRI IMAGING | Age: 86
DRG: 552 | End: 2021-04-19
Attending: FAMILY MEDICINE
Payer: MEDICARE

## 2021-04-19 PROBLEM — I10 HYPERTENSION: Status: ACTIVE | Noted: 2021-04-19

## 2021-04-19 PROBLEM — E11.9 DIABETES MELLITUS TYPE 2, CONTROLLED (HCC): Status: ACTIVE | Noted: 2021-04-19

## 2021-04-19 PROBLEM — I48.91 ATRIAL FIBRILLATION (HCC): Status: ACTIVE | Noted: 2021-04-19

## 2021-04-19 PROBLEM — I50.9 CHF (CONGESTIVE HEART FAILURE) (HCC): Status: ACTIVE | Noted: 2021-04-19

## 2021-04-19 LAB
CHOLEST SERPL-MCNC: 177 MG/DL
EST. AVERAGE GLUCOSE BLD GHB EST-MCNC: 180 MG/DL
GLUCOSE BLD STRIP.AUTO-MCNC: 166 MG/DL (ref 65–100)
GLUCOSE BLD STRIP.AUTO-MCNC: 204 MG/DL (ref 65–100)
GLUCOSE BLD STRIP.AUTO-MCNC: 246 MG/DL (ref 65–100)
GLUCOSE BLD STRIP.AUTO-MCNC: 261 MG/DL (ref 65–100)
HBA1C MFR BLD: 7.9 % (ref 4.2–6.3)
HDLC SERPL-MCNC: 55 MG/DL (ref 40–60)
HDLC SERPL: 3.2 {RATIO}
LDLC SERPL CALC-MCNC: 92 MG/DL
LIPID PROFILE,FLP: ABNORMAL
SERVICE CMNT-IMP: ABNORMAL
TRIGL SERPL-MCNC: 150 MG/DL (ref 35–150)
VLDLC SERPL CALC-MCNC: 30 MG/DL (ref 6–23)

## 2021-04-19 PROCEDURE — 70553 MRI BRAIN STEM W/O & W/DYE: CPT

## 2021-04-19 PROCEDURE — 74011250637 HC RX REV CODE- 250/637: Performed by: FAMILY MEDICINE

## 2021-04-19 PROCEDURE — 82962 GLUCOSE BLOOD TEST: CPT

## 2021-04-19 PROCEDURE — 36415 COLL VENOUS BLD VENIPUNCTURE: CPT

## 2021-04-19 PROCEDURE — C8929 TTE W OR WO FOL WCON,DOPPLER: HCPCS

## 2021-04-19 PROCEDURE — 74011000250 HC RX REV CODE- 250: Performed by: FAMILY MEDICINE

## 2021-04-19 PROCEDURE — 92610 EVALUATE SWALLOWING FUNCTION: CPT

## 2021-04-19 PROCEDURE — 74011636320 HC RX REV CODE- 636/320: Performed by: FAMILY MEDICINE

## 2021-04-19 PROCEDURE — 72158 MRI LUMBAR SPINE W/O & W/DYE: CPT

## 2021-04-19 PROCEDURE — 99218 HC RM OBSERVATION: CPT

## 2021-04-19 PROCEDURE — 72157 MRI CHEST SPINE W/O & W/DYE: CPT

## 2021-04-19 PROCEDURE — 74011636637 HC RX REV CODE- 636/637: Performed by: FAMILY MEDICINE

## 2021-04-19 PROCEDURE — 83036 HEMOGLOBIN GLYCOSYLATED A1C: CPT

## 2021-04-19 PROCEDURE — A9576 INJ PROHANCE MULTIPACK: HCPCS | Performed by: FAMILY MEDICINE

## 2021-04-19 PROCEDURE — 74011250636 HC RX REV CODE- 250/636: Performed by: FAMILY MEDICINE

## 2021-04-19 PROCEDURE — 80061 LIPID PANEL: CPT

## 2021-04-19 RX ORDER — DABIGATRAN ETEXILATE 150 MG/1
150 CAPSULE ORAL EVERY 12 HOURS
Status: DISCONTINUED | OUTPATIENT
Start: 2021-04-19 | End: 2021-04-21 | Stop reason: HOSPADM

## 2021-04-19 RX ORDER — METOPROLOL TARTRATE 50 MG/1
50 TABLET ORAL EVERY 12 HOURS
Status: DISCONTINUED | OUTPATIENT
Start: 2021-04-19 | End: 2021-04-21 | Stop reason: HOSPADM

## 2021-04-19 RX ORDER — SODIUM CHLORIDE 0.9 % (FLUSH) 0.9 %
10 SYRINGE (ML) INJECTION
Status: COMPLETED | OUTPATIENT
Start: 2021-04-19 | End: 2021-04-19

## 2021-04-19 RX ORDER — DILTIAZEM HYDROCHLORIDE 120 MG/1
240 CAPSULE, COATED, EXTENDED RELEASE ORAL DAILY
Status: DISCONTINUED | OUTPATIENT
Start: 2021-04-20 | End: 2021-04-21 | Stop reason: HOSPADM

## 2021-04-19 RX ADMIN — Medication 10 ML: at 09:23

## 2021-04-19 RX ADMIN — DABIGATRAN ETEXILATE MESYLATE 150 MG: 150 CAPSULE ORAL at 22:01

## 2021-04-19 RX ADMIN — ASPIRIN 81 MG: 81 TABLET, CHEWABLE ORAL at 10:48

## 2021-04-19 RX ADMIN — Medication 10 ML: at 21:59

## 2021-04-19 RX ADMIN — Medication 10 ML: at 14:18

## 2021-04-19 RX ADMIN — Medication 10 ML: at 05:58

## 2021-04-19 RX ADMIN — INSULIN LISPRO 2 UNITS: 100 INJECTION, SOLUTION INTRAVENOUS; SUBCUTANEOUS at 17:42

## 2021-04-19 RX ADMIN — METOPROLOL TARTRATE 50 MG: 50 TABLET, FILM COATED ORAL at 15:26

## 2021-04-19 RX ADMIN — PERFLUTREN 1 ML: 6.52 INJECTION, SUSPENSION INTRAVENOUS at 14:15

## 2021-04-19 RX ADMIN — LEVOTHYROXINE SODIUM 200 MCG: 0.1 TABLET ORAL at 05:58

## 2021-04-19 RX ADMIN — Medication 500 MG: at 10:48

## 2021-04-19 RX ADMIN — METOPROLOL TARTRATE 50 MG: 50 TABLET, FILM COATED ORAL at 22:00

## 2021-04-19 RX ADMIN — ATORVASTATIN CALCIUM 40 MG: 40 TABLET, FILM COATED ORAL at 10:48

## 2021-04-19 RX ADMIN — INSULIN LISPRO 4 UNITS: 100 INJECTION, SOLUTION INTRAVENOUS; SUBCUTANEOUS at 21:59

## 2021-04-19 RX ADMIN — INSULIN LISPRO 4 UNITS: 100 INJECTION, SOLUTION INTRAVENOUS; SUBCUTANEOUS at 12:00

## 2021-04-19 RX ADMIN — FUROSEMIDE 40 MG: 40 TABLET ORAL at 10:48

## 2021-04-19 RX ADMIN — GADOTERIDOL 18 ML: 279.3 INJECTION, SOLUTION INTRAVENOUS at 09:23

## 2021-04-19 RX ADMIN — INSULIN LISPRO 4 UNITS: 100 INJECTION, SOLUTION INTRAVENOUS; SUBCUTANEOUS at 08:00

## 2021-04-19 NOTE — PROGRESS NOTES
04/19/21 1950   NIH Stroke Scale   Interval Other (comment)   LOC 0   LOC Questions 0   LOC Commands 0   Best Gaze 0   Visual 0   Facial Palsy 0   Motor Right Arm 0   Motor Left Arm 0   Motor Right Leg 0   Motor Left Leg 0   Limb Ataxia 0   Sensory 0   Best Language 0   Dysarthria 0   Extinction and Inattention 0   Total 0

## 2021-04-19 NOTE — PROGRESS NOTES
Problem: Falls - Risk of  Goal: *Absence of Falls  Description: Document Sung Fall Risk and appropriate interventions in the flowsheet. Outcome: Progressing Towards Goal  Note: Fall Risk Interventions:  Mobility Interventions: Bed/chair exit alarm         Medication Interventions: Teach patient to arise slowly, Patient to call before getting OOB    Elimination Interventions: Bed/chair exit alarm              Problem: Patient Education: Go to Patient Education Activity  Goal: Patient/Family Education  Outcome: Progressing Towards Goal     Problem: Diabetes Self-Management  Goal: *Disease process and treatment process  Description: Define diabetes and identify own type of diabetes; list 3 options for treating diabetes. Outcome: Progressing Towards Goal  Goal: *Incorporating nutritional management into lifestyle  Description: Describe effect of type, amount and timing of food on blood glucose; list 3 methods for planning meals. Outcome: Progressing Towards Goal  Goal: *Incorporating physical activity into lifestyle  Description: State effect of exercise on blood glucose levels. Outcome: Progressing Towards Goal  Goal: *Developing strategies to promote health/change behavior  Description: Define the ABC's of diabetes; identify appropriate screenings, schedule and personal plan for screenings. Outcome: Progressing Towards Goal  Goal: *Using medications safely  Description: State effect of diabetes medications on diabetes; name diabetes medication taking, action and side effects. Outcome: Progressing Towards Goal  Goal: *Monitoring blood glucose, interpreting and using results  Description: Identify recommended blood glucose targets  and personal targets. Outcome: Progressing Towards Goal  Goal: *Prevention, detection, treatment of acute complications  Description: List symptoms of hyper- and hypoglycemia; describe how to treat low blood sugar and actions for lowering  high blood glucose level.   Outcome: Progressing Towards Goal  Goal: *Prevention, detection and treatment of chronic complications  Description: Define the natural course of diabetes and describe the relationship of blood glucose levels to long term complications of diabetes.   Outcome: Progressing Towards Goal  Goal: *Developing strategies to address psychosocial issues  Description: Describe feelings about living with diabetes; identify support needed and support network  Outcome: Progressing Towards Goal  Goal: *Insulin pump training  Outcome: Progressing Towards Goal  Goal: *Sick day guidelines  Outcome: Progressing Towards Goal  Goal: *Patient Specific Goal (EDIT GOAL, INSERT TEXT)  Outcome: Progressing Towards Goal     Problem: Patient Education: Go to Patient Education Activity  Goal: Patient/Family Education  Outcome: Progressing Towards Goal

## 2021-04-19 NOTE — PROGRESS NOTES
4/19/2021  OT order received and chart reviewed. Attempted this am but patient off the floor in AM. Attempted PM but based on MRI results will hold until assessed by neurosurgery. Will await their recommendations.  Thank you,  Clemencia Dent, OT

## 2021-04-19 NOTE — PROGRESS NOTES
Physical Therapy Note:    Physical therapy evaluation orders received and chart reviewed. Attempted to see patient this AM to initiate assessment. Patient currently off floor for MRI. Will await results of MRI of T/L Spine, continue to follow and re-attempt at a later time/date as schedule permits/patient available. MRI resulted in PM; neurosurgery consult has been placed. Will hold until recommendations and plan of care has been established.     Thank you,  Mena Craig, PT, DPT

## 2021-04-19 NOTE — PROGRESS NOTES
[facility] Hospitalist Note     Admit Date:  2021  9:35 AM   Name:  Cayetano Abreu   Age:  82 y.o.  :  1935   MRN:  [id number]   PCP:  Uri Ramirez MD  Treatment Team: Attending Provider: Arun Stahl MD; Physical Therapist: Dhruv Duenas, PT; Occupational Therapist: Leo Morales, OT; Care Manager: Lam Fraser; Speech Language Pathologist: Amira Hernández, DEMETRIO; Utilization Review: Maximino Watters; Staff Nurse: Hubert Louise, THIERRY; Charge Nurse: Mayra Barrios; Staff Nurse: Ashish SAWYER RN    HPI/Subjective:   Patient is a 82-year-old male with history of diabetes mellitus, CHF, A. fib on Pradaxa, history of prostate cancer sacral mets, history of thyroid and liver cancer which are in remission admitted for CVA work-up with new onset gait instability and urinary incontinence. CT head negative for acute pathology. Telemetry neurology consulted and recommend he is not a TPA candidate due to out of window and he is anticoagulated with Pradaxa. CTA head and neck negative for major intracranial arterial stenosis or occlusion. MRI brain with no acute pathology. MRI thoracic spine without definitive active metastasis. MRI lumbar spine with L3-4 disc bulge osteophyte complex with bilateral hypertrophic facet arthropathy and a broad-based shallow disc protrusion, central and left parasternal which creates a significant central stenosis. Neurosurgery consulted for evaluation. : Patient is seen at the bedside. Reports he is feeling okay. Continues to have urinary incontinence. Also reports blurry vision for couple of weeks. MRI brain with no active disease. Patient likely needs to have outpatient ophthalmology evaluation. MRI lumbar spine showed significant central stenosis at the level of L3/4. Will consult neurosurgery for evaluation. Spoke to the son and updated him on patient's current condition.     Assessment and Plan:     CVA work-up for ataxia and urinary incontinence:  Differential: Acute stroke versus spinal etiology given history of known prostate cancer with sacral metastasis  CT head with acute pathology  CTA head and neck negative for major intracranial arterial stenosis or occlusion  MRI brain negative for acute pathology  MRI lumbar spine with significant central stenosis at the level of L3-4  Echocardiogram pending  Neuro consulted, appreciate recommendation  Continue aspirin and statin  We will resume Pradaxa as no evidence of stroke   SLP/PT/OT  Stroke coordinator eval    L3-L4 central canal stenosis:  Neurosurgery consulted for evaluation    Hypertension/CHF/A. Fib:  Resume antihypertensive  Blood pressure goal less than 130/80  Continue aspirin, statin  Resume Pradaxa as no evidence of stroke    Diabetes mellitus:  Hold oral hypoglycemic  Start patient on SSI and will adjust insulin as needed    Prostate cancer with sacral mets:  Outpatient follow-up    Blurred vision/left right eye:  Outpatient ophthalmology follow-up    Discharge planning: Hopefully in 1 to 2 days if stable    DVT ppx ordered  Code status:  Full  Estimated LOS:  Greater than 2 midnights  Risk:  high    Hospital Problems as of 4/19/2021 Never Reviewed          Codes Class Noted - Resolved POA    Diabetes mellitus type 2, controlled ([facility] ICD-10-CM: E11.9  ICD-9-CM: 250.00  4/19/2021 - Present Unknown        Hypertension ICD-10-CM: I10  ICD-9-CM: 401.9  4/19/2021 - Present Unknown        Atrial fibrillation ([facility] ICD-10-CM: I48.91  ICD-9-CM: 427.31  4/19/2021 - Present Unknown        CHF (congestive heart failure) (HCC) ICD-10-CM: I50.9  ICD-9-CM: 428.0  4/19/2021 - Present Unknown        Urinary incontinence ICD-10-CM: R32  ICD-9-CM: 788.30  4/18/2021 - Present Unknown        * (Principal) Unsteady gait ICD-10-CM: R26.81  ICD-9-CM: 781.2  4/18/2021 - Present Unknown                10 systems reviewed and negative except as noted in HPI.   Past Medical History:   Diagnosis Date    Cancer [facility])     prostate, bones, thyroid    CHF [facility]congestive [facility] failure) [facility]     Diabetes ([facility]       Past Surgical History:   Procedure Laterality Date    HX HIP REPLACEMENT Right     HX THYROIDECTOMY        No Known Allergies   Social History     Tobacco Use    Smoking status: Former Smoker    Smokeless tobacco: Never Used   Substance Use Topics    Alcohol use: Yes      No family history on file. Family history reviewed and noncontributory. There is no immunization history on file for this patient. PTA Medications:  Prior to Admission Medications   Prescriptions Last Dose Informant Patient Reported? Taking? SITagliptin (JANUVIA) 50 mg tablet   Yes Yes   Sig: Take 50 mg by mouth daily. ascorbic acid, vitamin C, (VITAMIN C) 500 mg tablet   Yes Yes   Sig: Take 500 mg by mouth. atorvastatin (LIPITOR) 40 mg tablet   Yes Yes   Sig: Take 40 mg by mouth daily. benazepriL (LOTENSIN) 20 mg tablet   Yes Yes   Sig: Take 20 mg by mouth daily. cyanocobalamin (VITAMIN B12) 100 mcg tablet   Yes Yes   Sig: Take 100 mcg by mouth daily. dabigatran etexilate (PRADAXA) 150 mg capsule   Yes Yes   Sig: Take 150 mg by mouth every twelve (12) hours. diclofenac (Voltaren) 1 % gel   Yes Yes   Sig: Apply  to affected area four (4) times daily. dilTIAZem ER (TIAZAC) 240 mg capsule   Yes Yes   Sig: Take 240 mg by mouth daily. empagliflozin (JARDIANCE) 25 mg tablet   Yes Yes   Sig: Take 25 mg by mouth daily. enzalutamide (XTANDI) 40 mg capsule   Yes Yes   Sig: Take 160 mg by mouth daily. furosemide (LASIX) 40 mg tablet   Yes Yes   Sig: Take 40 mg by mouth daily. glipiZIDE SR (GLUCOTROL XL) 2.5 mg CR tablet   Yes Yes   Sig: Take 2.5 mg by mouth daily. glucosamine-chondroitin (Osteo Bi-Flex) 250-200 mg tab   Yes Yes   Sig: Take  by mouth.    glucosamine/chondr degroot A sod (OSTEO BI-FLEX PO)   Yes Yes   Sig: Take  by mouth.   levothyroxine (SYNTHROID) 200 mcg tablet   Yes Yes   Sig: Take 200 mcg by mouth Daily (before breakfast). metFORMIN (GLUCOPHAGE) 500 mg tablet   Yes Yes   Sig: Take 500 mg by mouth two (2) times daily (with meals). metoprolol tartrate (LOPRESSOR) 50 mg tablet   Yes Yes   Sig: Take 50 mg by mouth two (2) times a day. multivitamin, tx-iron-ca-min (THERA-M w/ IRON) 9 mg iron-400 mcg tab tablet   Yes Yes   Sig: Take 1 Tab by mouth daily. oxybutynin chloride XL (DITROPAN XL) 10 mg CR tablet   Yes Yes   Sig: Take 10 mg by mouth daily. elup-dqjq-mls-mlv-wuw-takc-hor (Tumersaid) [phone] mg tab   Yes Yes   Sig: Take  by mouth. Facility-Administered Medications: None       Objective:     Patient Vitals for the past 24 hrs:   Temp Pulse Resp BP SpO2   04/19/21 1148 97.9 °F (36.6 °C) (!) 102 (!) 96 129/83    04/19/21 1101  (!) 126      04/19/21 0738 97.8 °F (36.6 °C) 74 18 (!) 160/90 95 %   04/19/21 0449 97.6 °F (36.4 °C) 78 18 130/88 99 %   04/18/21 2339 98.2 °F (36.8 °C) 73 18 136/65 97 %   04/18/21 1929 98 °F (36.7 °C) 73 17 (!) 140/85 97 %   04/18/21 1517 97.4 °F (36.3 °C) 61 20 128/84 98 %     Oxygen Therapy  O2 Sat (%): 95 % (04/19/21 0738)  O2 Device: None (Room air) (04/19/21 0843)    Estimated body mass index is 28.7 kg/m² as calculated from the following:    Height as of this encounter: 5' 10\" (1.778 m). Weight as of this encounter: 90.7 kg (200 lb). Intake/Output Summary (Last 24 hours) at 4/19/2021 1422  Last data filed at 4/19/2021 1156  Gross per 24 hour   Intake    Output 1750 ml   Net -1750 ml       *Note that automatically entered I/Os may not be accurate; dependent on patient compliance with collection and accurate  by assistants. Physical Exam:  General:    Well nourished. Alert. Eyes:   Normal sclerae. Extraocular movements intact. Left red eye  HENT:  Normocephalic, atraumatic. Moist mucous membranes  CV:   Irregularly irregular rhythm  Lungs:  CTAB. No wheezing, rhonchi, or rales. Abdomen: Soft, nontender, nondistended. Extremities: Warm and dry. No cyanosis or edema. Neurologic: CN II-XII grossly intact. Sensation intact. Motor upper and lower extremity 5/5, gait abnormal, leaning more towards right  Skin:     No rashes or jaundice. Normal coloration  Psych:  Normal mood and affect. I reviewed the labs, imaging, EKGs, telemetry, and other studies done this admission.   Data Reviewed:   Recent Results (from the past 24 hour(s))   GLUCOSE, POC    Collection Time: 04/18/21  4:41 PM   Result Value Ref Range    Glucose (POC) 169 (H) 65 - 100 mg/dL    Performed by Felicia Barron, POC    Collection Time: 04/18/21  8:48 PM   Result Value Ref Range    Glucose (POC) 157 (H) 65 - 100 mg/dL    Performed by Elle    LIPID PANEL    Collection Time: 04/19/21  5:48 AM   Result Value Ref Range    LIPID PROFILE          Cholesterol, total 177 <200 MG/DL    Triglyceride 150 35 - 150 MG/DL    HDL Cholesterol 55 40 - 60 MG/DL    LDL, calculated 92 <100 MG/DL    VLDL, calculated 30 (H) 6.0 - 23.0 MG/DL    CHOL/HDL Ratio 3.2     HEMOGLOBIN A1C WITH EAG    Collection Time: 04/19/21  5:48 AM   Result Value Ref Range    Hemoglobin A1c 7.9 (H) 4.20 - 6.30 %    Est. average glucose 180 mg/dL   GLUCOSE, POC    Collection Time: 04/19/21  7:35 AM   Result Value Ref Range    Glucose (POC) 246 (H) 65 - 100 mg/dL    Performed by CaroltLKamala    GLUCOSE, POC    Collection Time: 04/19/21 11:44 AM   Result Value Ref Range    Glucose (POC) 261 (H) 65 - 100 mg/dL    Performed by KmuittLindaV        All Micro Results     None          Current Facility-Administered Medications   Medication Dose Route Frequency    perflutren lipid microspheres (DEFINITY) in NS bolus IV  1 mL IntraVENous PRN    metoprolol tartrate (LOPRESSOR) tablet 50 mg  50 mg Oral Q12H    dabigatran etexilate (PRADAXA) capsule 150 mg  150 mg Oral Q12H    [START ON 4/20/2021] dilTIAZem ER (CARDIZEM CD) capsule 240 mg  240 mg Oral DAILY    aspirin chewable tablet 81 mg  81 mg Oral DAILY    ascorbic acid (vitamin C) (VITAMIN C) tablet 500 mg  500 mg Oral DAILY    atorvastatin (LIPITOR) tablet 40 mg  40 mg Oral DAILY    enzalutamide (XTANDI) chemo capsule 160 mg (Patient Supplied)  160 mg Oral DAILY    furosemide (LASIX) tablet 40 mg  40 mg Oral DAILY    levothyroxine (SYNTHROID) tablet 200 mcg  200 mcg Oral ACB    sodium chloride (NS) flush 5-40 mL  5-40 mL IntraVENous Q8H    sodium chloride (NS) flush 5-40 mL  5-40 mL IntraVENous PRN    labetaloL (NORMODYNE;TRANDATE) injection 5 mg  5 mg IntraVENous Q10MIN PRN    insulin lispro (HUMALOG) injection   SubCUTAneous AC&HS       Other Studies:  No results found for this visit on 04/18/21. Mri Brain W Wo Cont    Result Date: 4/19/2021  Exam: MRI BRAIN W WO CONT on 4/19/2021 10:04 AM Clinical History: The Male patient is 82years old presenting for dizziness, unsteady gait-18cc prohance -Hx of cancer with mets - prostate-No prior mri. Comparison:  Head CT 4/18/2021 Technique:   T2-weighted, T1-weighted, FLAIR, and diffusion-weighted transaxial , T1 sagittal, and gradient echo coronal pulse sequences were initially performed. Following  the administration of contrast, additional T1-weighted transaxial and coronal sequences were performed. 18 mL of ProHance contrast was administered intravenously. Findings: Age-related cortical involutional changes are seen with sulcal and ventricular prominence. There is chronic confluent periventricular white matter disease with lacunae throughout the corona radiata and centrum semiovale as well as basal ganglia. Focal gliotic changes extend to the left parietal convexity and suggesting remote ischemic insult. No evidence of acute intracranial ischemia is seen there is a small left parafalcine meningioma measuring 5 mm. There are no areas of abnormal enhancement. There are no abnormal extra-axial fluid collections. No evidence of mass or mass effect is seen.  There is no diffusion signal abnormality. Expected flow voids are maintained in the major intracranial vessels. Cerebellar involutional changes are seen. There is minimal chronic posterior midline pontine microvascular disease. There is no evidence of Chiari malformation. The ventricular system and CSF containing spaces are unremarkable in appearance. Visualized extracranial soft tissues are unremarkable. The paranasal sinuses are well pneumatized and aerated. There is mild right maxillary sinus mucosal thickening     1. Age-related senescent changes and minimal chronic microvascular disease without acute intracranial abnormality. CPT Code:  99429     Mri Thorac Spine W Wo Cont    Result Date: 4/19/2021  Exam: MRI [facility] SPINE W WO CONT on 4/19/2021 10:17 AM Clinical History: The Male patient is 82years old presenting for dizziness, unsteady gait-sudden onset unsteady gait, urinary continence, known metastatic prostate cancer-18cc prohance -Hx of cancer with mets - prostate-No prior mri. Technique:  Sagittal T2, sagittal STIR, axial gradient recalled echo, sagittal T1 and axial T1 weighted scans were performed. Axial  and sagittal T1 weighted scans were obtained after  18 mL of ProHance contrast was administered intravenously. Comparison: None Findings: Normal curvature and alignment is maintained throughout the thoracic spine. There is no significant vertebral body height loss. Scattered sclerotic lesions are seen throughout the marrow consistent with history of metastatic prostate cancer. There is multilevel mild degenerative disc disease with small posterior and superior disc extrusion at T8/9. The conus ends at T12/L1, and there is no evidence of abnormal cord signal. There is focal enhancement in the disc space along left anterolateral lateral margin of T11/12 which is of indeterminate etiology. Small bilateral posterior layering pleural effusions.  Incidentally noted are small renal cysts, with an additional lesion in the posterior left mid kidney which is not clearly cystic and measures approximately 2 cm. 1. Scattered sclerotic marrow lesions in keeping with history of metastatic prostate cancer however without definite active metastases identified. 2. Indeterminate enhancing lesion along the left anterolateral margin of the T11/12 disc space likely related to degenerative disc disease. 3. Indeterminate solid-appearing left posterior mid renal lesion. If clinically warranted, further evaluation with contrast-enhanced CT is recommended to exclude a renal mass, or consideration for ultrasound. CPT code: 17 [address]    Result Date: 4/19/2021  MRI LUMBAR SPINE WITHOUT CONTRAST. HISTORY: Unsteady gait and urinary incontinence, metastatic prostate cancer COMPARISON: MRI of thoracic spine TECHNIQUE:  Axial and sagittal, T1 and T2 and sagittal STIR images. FINDINGS:  Spinal alignment anatomic. The conus is at the T12-L1 level. Bone marrow signal intensity demonstrates an 8 mm rounded low T1 and T2 signal intensity focus in the body of L2 and a similar 12 mm rounded low T1 and T2 signal intensity focus in the body of L5. These both enhance following gadolinium. Included portion of the retroperitoneum simple cyst right kidney L1-L2: No focal disc herniation or stenosis. Mild facet arthropathy. Lateral foramina unremarkable. L2-L3: No focal disc herniation or stenosis. Mild hypertrophic facet arthropathy. Lateral foramina unremarkable L3-L4: Disc bulge osteophyte complex and broad-based shallow disc protrusion centrally and left paracentral location creates a central stenosis at 6 mm on the left. Lateral foramina unremarkable. L4-L5: Bilateral hypertrophic facet arthropathy. No severe central stenosis or disc herniation. Lateral foramina unremarkable. L5-S1:  Disc space is narrowed. Mild facet hypertrophy. No disc herniation, central stenosis or foraminal narrowing.      1) L3-4 disc bulge osteophyte complex with bilateral hypertrophic facet arthropathy and a broad-based shallow disc protrusion, central and left paracentral which creates a significant central stenosis as above. 2) Small sclerotic metastases at L2 and L5. SARS-CoV-2 Lab Results  \"Novel Coronavirus\" Test: No results found for: COV2NT   \"Emergent Disease\" Test: No results found for: EDPR  \"SARS-COV-2\" Test: No results found for: XGCOVT  Rapid Test: No results found for: COVR            Part of this note was written by using a voice dictation software and the note has been proof read but may still contain some grammatical/other typographical errors.     Signed:  Timmy Jordan MD

## 2021-04-19 NOTE — PROGRESS NOTES
CM attempted to see patient this day. Patient off the floor at this time for MRI, according to daughter. Patient's daughter Linette Hughes [phone] present at bedside. Demographic information verified by daughter. The patient resides with friends of the family. The patient lives in a two story home with no steps at the entrance. Per daughter, the patient is independent with completing all ADL's and cooks his own meals. Daughter also provides meals to patient. Patient no longer drives. Family provides transportation when needed. DME: CPAP. Per daughter, patient is not compliant with CPAP machine. Diabetes:YES  Patient obtains his prescription medications from the [facility]. Daughter voiced no difficulty with obtaining medications in the community. Discharge planning: PT/OT/Speech has been consulted. Daughter denies any history of [facility] services or REHAB. Dispo: TBD at this time. CM will continue to follow plan of care and therapy's recommendations, to assist further with discharge planning. Please consult or notify CM if any needs shall arise. CM remains available. Care Management Interventions  PCP Verified by CM: Yes(Dr. Manpreet Lazar- Daughter unsure of PCP's practice name. )  Mode of Transport at Discharge: Other (see comment)(TBD: based upon need. )  Transition of Care Consult (CM Consult): Discharge Planning  Discharge Durable Medical Equipment: Yes  Physical Therapy Consult: Yes  Occupational Therapy Consult: Yes  Speech Therapy Consult: Yes  Current Support Network: Other(Per daughter, the patient resides with friends. )  Confirm Follow Up Transport: Family(Patient no longer driving. )  Name of the Patient Representative Who was Provided with a Choice of Provider and Agrees with the Discharge Plan: Patient's daughter Linette Hughes.    [place] Resource Information Provided?: No  Discharge Location  Discharge Placement: Unable to determine at this time

## 2021-04-19 NOTE — PROGRESS NOTES
SPEECH PATHOLOGY NOTE:    Patient currently off the floor for MRI. Will follow up for dysphagia evaluation at later time today as he becomes available. ADDENDUM: Patient receiving ECHO at time of second attempt. Will follow up at later time.      Akil Orona, [facility] MAREN KEITH, CCC-SLP

## 2021-04-19 NOTE — PROGRESS NOTES
SPEECH LANGUAGE PATHOLOGY: DYSPHAGIA- Initial Assessment and Discharge    NAME/AGE/GENDER: Almita Weir is a 82 y.o. male  DATE: 4/19/2021  PRIMARY DIAGNOSIS: Unsteady gait [R26.81]  Urinary incontinence [R32]      ICD-10: Treatment Diagnosis: R13.12 Dysphagia, Oropharyngeal Phase    RECOMMENDATIONS   DIET:    Regular Consistency   Thin Liquids    MEDICATIONS: With liquid     ASPIRATION PRECAUTIONS  · Slow rate of intake  · Small bites/sips  · Upright at 90 degrees during meal     COMPENSATORY STRATEGIES/MODIFICATIONS  · None     RECOMMENDATIONS for CONTINUED SPEECH THERAPY:   No further speech therapy indicated at this time. ASSESSMENT   Patient presents with oropharyngeal swallow function that is within normal limits. No s/sx of dysphagia identified. Recommend regular diet/thin liquids. Medications whole with liquid wash. No dysphagia needs identified. EDUCATION:  · Recommendations discussed with Patient  CONTINUATION OF SKILLED SERVICES/MEDICAL NECESSITY:   No additional speech services warranted. REHABILITATION POTENTIAL FOR STATED GOALS: Excellent    PLAN    FREQUENCY/DURATION: No further speech therapy indicated at this time as oropharyngeal swallow function is within normal limits. - Recommendations for next treatment session: No additional speech therapy indicated at this time. SUBJECTIVE   Upright on side of bed. Cooperative with evaluation. Reports he lives alone. Denies history of dysphagia    Oxygen Device: room air  Pain: Pain Scale 1: Numeric (0 - 10)  Pain Intensity 1: 0    History of Present Injury/Illness: Mr. Deja Rowland  has a past medical history of Cancer ([facility] [address] CHF (congestive heart failure) ([facility] [address] and Diabetes ([facility] [address] . He also  has a past surgical history that includes hx hip replacement (Right) and hx thyroidectomy. PRECAUTIONS/ALLERGIES: Patient has no known allergies. Problem List:  (Impairments causing functional limitations):  1.  Oropharyngeal dysphagia- No symptoms identified  2. Previous Dysphagia: NONE REPORTED  Diet Prior to Evaluation: Regular diet/thin liquids    Orientation:  Person  Place  Time  Situation    Cognitive-Linguistic Screening:   Alertness  o Alert   Speech Production:   o Fully intelligible   Expressive Language:  o Fluent speech and Able to effectively communicate wants and needs   Receptive Language:  o Answers yes/no questions appropriately   Cognition:   o Appropriate sustained attention during evaluation. Able to recall recent events accurately  Prior Level of Function: Lives independently. No longer drives (as of last week per his report). Recommendations: Given results of screening, patient appears to be functioning at baseline. He denies any acute changes to speech, language, or cognitive abilities. Deficits related to mobility only. MRI brain negative for clinically significant findings    OBJECTIVE   Oral Motor:   · Labial: No impairment  · Dentition: Intact  · Oral Hygiene: Adequate  · Lingual: No impairment    Dysphagia evaluation:   Patient presented with thin liquid via cup and straw, puree, mixed, and solid consistencies. Appropriate oral prep with all textures. Timely swallow initiation, and single swallows upon palpation. Adequate oral clearing. No overt signs or symptoms of airway compromise observed with liquid or solid textures.        Tool Used: Dysphagia Outcome and Severity Scale (LUIS FERNANDO)    Score Comments   Normal Diet  [] 7 With no strategies or extra time needed   Functional Swallow  [] 6 May have mild oral or pharyngeal delay   Mild Dysphagia  [] 5 Which may require one diet consistency restricted    Mild-Moderate Dysphagia  [] 4 With 1-2 diet consistencies restricted   Moderate Dysphagia  [] 3 With 2 or more diet consistencies restricted   Moderate-Severe Dysphagia  [] 2 With partial PO strategies (trials with ST only)   Severe Dysphagia  [] 1 With inability to tolerate any PO safely      Score:  Initial: 7 Most Recent: x (Date 04/19/21 )   Interpretation of Tool: The Dysphagia Outcome and Severity Scale (LUIS FERNANDO) is a simple, easy-to-use, 7-point scale developed to systematically rate the functional severity of dysphagia based on objective assessment and make recommendations for diet level, independence level, and type of nutrition. Current Medications:   No current facility-administered medications on file prior to encounter. Current Outpatient Medications on File Prior to Encounter   Medication Sig Dispense Refill    dabigatran etexilate (PRADAXA) 150 mg capsule Take 150 mg by mouth every twelve (12) hours.  oxybutynin chloride XL (DITROPAN XL) 10 mg CR tablet Take 10 mg by mouth daily.  empagliflozin (JARDIANCE) 25 mg tablet Take 25 mg by mouth daily.  furosemide (LASIX) 40 mg tablet Take 40 mg by mouth daily.  metFORMIN (GLUCOPHAGE) 500 mg tablet Take 500 mg by mouth two (2) times daily (with meals).  metoprolol tartrate (LOPRESSOR) 50 mg tablet Take 50 mg by mouth two (2) times a day.  benazepriL (LOTENSIN) 20 mg tablet Take 20 mg by mouth daily.  enzalutamide (XTANDI) 40 mg capsule Take 160 mg by mouth daily.  multivitamin, tx-iron-ca-min (THERA-M w/ IRON) 9 mg iron-400 mcg tab tablet Take 1 Tab by mouth daily.  glucosamine/chondr degroot A sod (OSTEO BI-FLEX PO) Take  by mouth.  glucosamine-chondroitin (Osteo Bi-Flex) 250-200 mg tab Take  by mouth.  diclofenac (Voltaren) 1 % gel Apply  to affected area four (4) times daily.  atorvastatin (LIPITOR) 40 mg tablet Take 40 mg by mouth daily.  dilTIAZem ER (TIAZAC) 240 mg capsule Take 240 mg by mouth daily.  glipiZIDE SR (GLUCOTROL XL) 2.5 mg CR tablet Take 2.5 mg by mouth daily.  SITagliptin (JANUVIA) 50 mg tablet Take 50 mg by mouth daily.  levothyroxine (SYNTHROID) 200 mcg tablet Take 200 mcg by mouth Daily (before breakfast).       vdpm-hgoc-svt-tal-nir-guly-hor (Tumersaid) [phone] mg tab Take  by mouth.  cyanocobalamin (VITAMIN B12) 100 mcg tablet Take 100 mcg by mouth daily.  ascorbic acid, vitamin C, (VITAMIN C) 500 mg tablet Take 500 mg by mouth.           INTERDISCIPLINARY COLLABORATION: RN    After treatment position/precautions:  · Sitting on side of bed   · Call light within reach    Total Treatment Duration:   Time In: 1410  Time Out: [address] [id number] [facility]

## 2021-04-20 PROBLEM — C79.51 PROSTATE CANCER METASTATIC TO BONE (HCC): Status: ACTIVE | Noted: 2021-04-20

## 2021-04-20 PROBLEM — C61 PROSTATE CANCER (HCC): Status: ACTIVE | Noted: 2021-04-20

## 2021-04-20 PROBLEM — M48.061 LUMBAR SPINAL STENOSIS: Status: ACTIVE | Noted: 2021-04-20

## 2021-04-20 LAB
GLUCOSE BLD STRIP.AUTO-MCNC: 132 MG/DL (ref 65–100)
GLUCOSE BLD STRIP.AUTO-MCNC: 141 MG/DL (ref 65–100)
GLUCOSE BLD STRIP.AUTO-MCNC: 273 MG/DL (ref 65–100)
GLUCOSE BLD STRIP.AUTO-MCNC: 329 MG/DL (ref 65–100)
SERVICE CMNT-IMP: ABNORMAL

## 2021-04-20 PROCEDURE — 74011250637 HC RX REV CODE- 250/637: Performed by: FAMILY MEDICINE

## 2021-04-20 PROCEDURE — 99218 HC RM OBSERVATION: CPT

## 2021-04-20 PROCEDURE — 82962 GLUCOSE BLOOD TEST: CPT

## 2021-04-20 PROCEDURE — 74011636637 HC RX REV CODE- 636/637: Performed by: FAMILY MEDICINE

## 2021-04-20 RX ADMIN — INSULIN LISPRO 8 UNITS: 100 INJECTION, SOLUTION INTRAVENOUS; SUBCUTANEOUS at 12:11

## 2021-04-20 RX ADMIN — INSULIN LISPRO 6 UNITS: 100 INJECTION, SOLUTION INTRAVENOUS; SUBCUTANEOUS at 21:15

## 2021-04-20 RX ADMIN — Medication 10 ML: at 21:15

## 2021-04-20 RX ADMIN — METOPROLOL TARTRATE 50 MG: 50 TABLET, FILM COATED ORAL at 21:14

## 2021-04-20 RX ADMIN — ASPIRIN 81 MG: 81 TABLET, CHEWABLE ORAL at 08:47

## 2021-04-20 RX ADMIN — LEVOTHYROXINE SODIUM 200 MCG: 0.1 TABLET ORAL at 05:38

## 2021-04-20 RX ADMIN — ATORVASTATIN CALCIUM 40 MG: 40 TABLET, FILM COATED ORAL at 08:47

## 2021-04-20 RX ADMIN — Medication 500 MG: at 09:00

## 2021-04-20 RX ADMIN — Medication 10 ML: at 14:57

## 2021-04-20 RX ADMIN — FUROSEMIDE 40 MG: 40 TABLET ORAL at 08:47

## 2021-04-20 RX ADMIN — Medication 10 ML: at 05:37

## 2021-04-20 RX ADMIN — METOPROLOL TARTRATE 50 MG: 50 TABLET, FILM COATED ORAL at 08:47

## 2021-04-20 NOTE — PROGRESS NOTES
[facility] Hospitalist Progress Note    Patient: Vonda Laughlin MRN: [id number]  SSN: [id number]    YOB: 1935  Age: 82 y.o. Sex: male      Admit Date: 4/18/2021    LOS: 0 days     Subjective:     Chief Complaint: Ataxia and blurry vision  Reason for Admission: Lumbar spinal stenosis    82-year-old male with history of diabetes mellitus, CHF, A. fib on Pradaxa, history of prostate cancer sacral mets, history of thyroid and liver cancer which are in remission admitted for CVA work-up with new onset gait instability and urinary incontinence. CT head negative for acute pathology. Telemetry neurology consulted and recommend he is not a TPA candidate due to out of window and he is anticoagulated with Pradaxa. CTA head and neck negative for major intracranial arterial stenosis or occlusion. MRI brain with no acute pathology. MRI thoracic spine without definitive active metastasis. MRI lumbar spine with L3-4 disc bulge osteophyte complex with bilateral hypertrophic facet arthropathy and a broad-based shallow disc protrusion, central and left parasternal which creates a significant central stenosis. 4/20 - He continues to have blurry vision and unsteady gait. Having some urinary incontinence. Denies back pain. Denies F/C. [place] N/V/D. Review of systems negative except stated above. Assessment/Plan (MDM): Active Medical Complaints and Diagnoses:    Principal Problem:    Lumbar spinal stenosis (4/20/2021)  - MRI with significant spinal stenosis  - Now with weakness and bladder incontinence  - Spine ortho consulted    Active Problems:    Urinary incontinence (4/18/2021)  - ? Due to spinal stenosis vs prostate CA  - Continue to monitor  - Ortho consulted      Unsteady gait (4/18/2021)  - ? Due to spinal stenosis  - PT/OT      Diabetes mellitus type 2, controlled ([address] (4/19/2021)  - Stable  - Continue current meds      Hypertension (4/19/2021)  - Stable      [facility] (4/20/2021)  - MRI with vertebral mets      Atrial fibrillation ([facility] (4/19/2021)   - Stable  - Continue current meds      CHF (congestive heart failure) ([facility] [facility] (4/19/2021)      Otherwise all chronic medical issues appear stable with no changes. See assessment at bottom of progress note for complete acute, chronic, and resolved hospital medical issues. Diet: DIET CARDIAC Regular  DIET NPO Except Meds  VTE ppx: SCDs    Objective:     Visit Vitals  BP (!) 165/85 (BP 1 Location: Left upper arm, BP Patient Position: At rest)   Pulse 75   Temp 98.1 °F (36.7 °C)   Resp 18   Ht 5' 10\" (1.778 m)   Wt 91.2 kg (201 lb 1.6 oz)   SpO2 97%   BMI 28.05 kg/m²      Oxygen Therapy  O2 Sat (%): 97 % (04/20/21 1628)  O2 Device: None (Room air) (04/20/21 0954)      Intake and Output:     Intake/Output Summary (Last 24 hours) at 4/20/2021 1706  Last data filed at 4/20/2021 0634  Gross per 24 hour   Intake 240 ml   Output    Net 240 ml         Physical Exam:   GENERAL: alert, cooperative, no distress, appears stated age  EYE: conjunctivae/corneas clear. PERRL. THROAT & NECK: normal and no erythema or exudates noted. LUNG: clear to auscultation bilaterally  HEART: regular rate and rhythm, S1S2, no murmur, no JVD  ABDOMEN: soft, non-tender, non-distended. Bowel sounds normal.   EXTREMITIES:  No edema, 4/5 strength BLE  SKIN: no rash or abnormalities  NEUROLOGIC: A&Ox3. Cranial nerves 2-12 grossly intact.     Lab/Data Review:  Recent Results (from the past 24 hour(s))   GLUCOSE, POC    Collection Time: 04/19/21  8:59 PM   Result Value Ref Range    Glucose (POC) 204 (H) 65 - 100 mg/dL    Performed by [facility] [employer] [employer] [facility], POC    Collection Time: 04/20/21  7:26 AM   Result Value Ref Range    Glucose (POC) 132 (H) 65 - 100 mg/dL    Performed by [employer]    GLUCOSE, POC    Collection Time: 04/20/21 10:46 AM   Result Value Ref Range    Glucose (POC) 329 (H) 65 - 100 mg/dL    [facility]    GLUCOSE, POC Collection Time: 04/20/21  4:16 PM   Result Value Ref Range    Glucose (POC) 141 (H) 65 - 100 mg/dL    Performed by [facility]        SARS-CoV-2 Lab Results  \"Novel Coronavirus\" Test: No results found for: COV2NT   \"Emergent Disease\" Test: No results found for: EDPR  \"SARS-COV-2\" Test: No results found for: XGCOVT  \"Precision Labs\" Test: No results found for: RSLT  Rapid Test: No results found for: COVR        Imaging:  Mri Brain W Wo Cont    Result Date: 4/19/2021  Exam: MRI BRAIN W WO CONT on 4/19/2021 10:04 AM Clinical History: The Male patient is 82years old presenting for dizziness, unsteady gait-18cc prohance -Hx of cancer with mets - prostate-No prior mri. Comparison:  Head CT 4/18/2021 Technique:   T2-weighted, T1-weighted, FLAIR, and diffusion-weighted transaxial , T1 sagittal, and gradient echo coronal pulse sequences were initially performed. Following  the administration of contrast, additional T1-weighted transaxial and coronal sequences were performed. 18 mL of ProHance contrast was administered intravenously. Findings: Age-related cortical involutional changes are seen with sulcal and ventricular prominence. There is chronic confluent periventricular white matter disease with lacunae throughout the corona radiata and centrum semiovale as well as basal ganglia. Focal gliotic changes extend to the left parietal convexity and suggesting remote ischemic insult. No evidence of acute intracranial ischemia is seen there is a small left parafalcine meningioma measuring 5 mm. There are no areas of abnormal enhancement. There are no abnormal extra-axial fluid collections. No evidence of mass or mass effect is seen. There is no diffusion signal abnormality. Expected flow voids are maintained in the major intracranial vessels. Cerebellar involutional changes are seen. There is minimal chronic posterior midline pontine microvascular disease. There is no evidence of Chiari malformation.  The ventricular system and CSF containing spaces are unremarkable in appearance. Visualized extracranial soft tissues are unremarkable. The paranasal sinuses are well pneumatized and aerated. There is mild right maxillary sinus mucosal thickening     1. Age-related senescent changes and minimal chronic microvascular disease without acute intracranial abnormality. CPT Code:  07307     Mri Thorac Spine W Wo Cont    Result Date: 4/19/2021  Exam: MRI [facility] SPINE W WO CONT on 4/19/2021 10:17 AM Clinical History: The Male patient is 82years old presenting for dizziness, unsteady gait-sudden onset unsteady gait, urinary continence, known metastatic prostate cancer-18cc prohance -Hx of cancer with mets - prostate-No prior mri. Technique:  Sagittal T2, sagittal STIR, axial gradient recalled echo, sagittal T1 and axial T1 weighted scans were performed. Axial  and sagittal T1 weighted scans were obtained after  18 mL of ProHance contrast was administered intravenously. Comparison: None Findings: Normal curvature and alignment is maintained throughout the thoracic spine. There is no significant vertebral body height loss. Scattered sclerotic lesions are seen throughout the marrow consistent with history of metastatic prostate cancer. There is multilevel mild degenerative disc disease with small posterior and superior disc extrusion at T8/9. The conus ends at T12/L1, and there is no evidence of abnormal cord signal. There is focal enhancement in the disc space along left anterolateral lateral margin of T11/12 which is of indeterminate etiology. Small bilateral posterior layering pleural effusions. Incidentally noted are small renal cysts, with an additional lesion in the posterior left mid kidney which is not clearly cystic and measures approximately 2 cm. 1. Scattered sclerotic marrow lesions in keeping with history of metastatic prostate cancer however without definite active metastases identified.  2. Indeterminate enhancing lesion along the left anterolateral margin of the T11/12 disc space likely related to degenerative disc disease. 3. Indeterminate solid-appearing left posterior mid renal lesion. If clinically warranted, further evaluation with contrast-enhanced CT is recommended to exclude a renal mass, or consideration for ultrasound. CPT code: [address]    Result Date: 4/19/2021  MRI LUMBAR SPINE WITHOUT CONTRAST. HISTORY: Unsteady gait and urinary incontinence, metastatic prostate cancer COMPARISON: MRI of thoracic spine TECHNIQUE:  Axial and sagittal, T1 and T2 and sagittal STIR images. FINDINGS:  Spinal alignment anatomic. The conus is at the T12-L1 level. Bone marrow signal intensity demonstrates an 8 mm rounded low T1 and T2 signal intensity focus in the body of L2 and a similar 12 mm rounded low T1 and T2 signal intensity focus in the body of L5. These both enhance following gadolinium. Included portion of the retroperitoneum simple cyst right kidney L1-L2: No focal disc herniation or stenosis. Mild facet arthropathy. Lateral foramina unremarkable. L2-L3: No focal disc herniation or stenosis. Mild hypertrophic facet arthropathy. Lateral foramina unremarkable L3-L4: Disc bulge osteophyte complex and broad-based shallow disc protrusion centrally and left paracentral location creates a central stenosis at 6 mm on the left. Lateral foramina unremarkable. L4-L5: Bilateral hypertrophic facet arthropathy. No severe central stenosis or disc herniation. Lateral foramina unremarkable. L5-S1:  Disc space is narrowed. Mild facet hypertrophy. No disc herniation, central stenosis or foraminal narrowing. 1) L3-4 disc bulge osteophyte complex with bilateral hypertrophic facet arthropathy and a broad-based shallow disc protrusion, central and left paracentral which creates a significant central stenosis as above. 2) Small sclerotic metastases at L2 and L5.      Ct Perf W Cbf    Result Date: 4/18/2021  HISTORY: 68-year-old male with weakness, unsteadiness, and leaning to the right EXAM/TECHNIQUE: CT Perfusion Imaging. CT perfusion imaging of the brain was performed after the administration of intravenous contrast.  Perfusion maps and perfusion analysis output were generated using the [employer] perfusion processing software algorithm. Radiation dose reduction techniques were used for this study: All CT scans performed at this facility use one or all of the following: Automated exposure control, adjustment of the mA and/or kVp according to patient's size, iterative reconstruction. COMPARISON: Noncontrast CT head exam on 4/18/2021. FINDINGS: [employer]ib[employer] Output Values: CBF < 30% volume:  0 ml   (core infarction volume greater than 50 cc associated with poor outcomes). Tmax > 6 seconds: 0 ml Tmax/CBF Mismatch Volume: 0 ml Tmax/CBF Mismatch Ratio: Not applicable Tmax > 10 seconds Volume: 0 ml   (volume greater than 100 mL is associated with poor outcome) Hypoperfusion Intensity Ratio (Tmax > 10 seconds/Tmax > 6 seconds): 0   (values greater than 0.5 associated with poor outcome)     No CT evidence of acute perfusion abnormality. Cta Code Neuro Head And Neck W Cont    Result Date: 4/18/2021  HISTORY: 82years of age Male with weakness and unsteadiness. EXAM: CTA CODE NEURO HEAD AND NECK W CONT. Radiation reduction dose techniques were used for the study. Our CT scanner use one or all of the following- Automated exposure control, adjustment of the mA and/or KV according the patient size, iterative reconstruction. 3-D multiplanar reformations were performed at the workstation. All carotid artery stenosis percentages are based upon NASCET criteria if appropriate. Per the CT technologist, this study was analyzed by the [address] ai algorithm. COMPARISON: No prior vascular imaging of the head or neck available. Noncontrast CT head and CT perfusion exams on 4/18/2021.  FINDINGS: VASCULAR FINDINGS: Cervical CTA: There is a three-vessel branching pattern of the aortic arch. Mild atherosclerotic disease of the aortic arch. The right subclavian artery is patent where visualized. The left subclavian artery is patent where visualized. The right common carotid artery is patent without stenosis. Right external carotid artery is patent. No significant atherosclerotic disease or stenosis of the right carotid bulb or proximal ICA. Cervical right ICA is diffusely patent. Vertebral arteries are codominant. Cervical right vertebral artery is diffusely patent. Left common carotid artery is patent. The left external carotid artery is patent. No significant atherosclerotic disease or stenosis of the left carotid bulb or proximal ICA. Cervical left ICA is diffusely patent. Cervical left vertebral artery is diffusely patent. No significant stenosis or occlusion in the major cervical arterial vasculature. Cranial CTA: Intracranial right internal carotid artery is patent. Intracranial left internal carotid artery is patent. There is atherosclerotic calcification of the bilateral cavernous segments without significant stenosis. Right and left A1 segments are patent. Bilateral A2 and A3 segments are patent. Right M1 is patent. Major proximal right MCA branches beyond the bifurcation are patent. Left M1 is patent. Major proximal left MCA branches beyond the bifurcation are patent. Intracranial right and left vertebral arteries are patent. Atherosclerotic calcification at the level of the foramen magnum in both arteries without significant stenosis. Basilar artery is patent. Right posterior cerebral artery is patent. Left posterior cerebral artery is patent. No evidence of intracranial aneurysms. No proximal vessel occlusion. Major dural venous sinuses are not well evaluated but appear to be grossly patent. NONVASCULAR FINDINGS: Head: There is no evidence of intracranial enhancing masses or focal fluid collections.  There are chronic ischemic white matter demyelination changes. Oral structures demonstrate findings of lens replacements. Calvarial and maxillofacial soft tissues are without evidence of significant acute abnormality. Neck: Thyroid gland is aplastic versus surgically absent. No evidence of significant cervical or upper thoracic adenopathy. The visualized upper lungs are without areas of prominent focal consolidation or masses. OSSEOUS STRUCTURES: Mastoid air cells are well aerated. Paranasal sinuses demonstrate mucoperiosteal thickening/irregular secretion in the right maxillary sinus. Minimal mucoperiosteal thickening in anterior ethmoid air cells bilaterally. There are are some multilevel degenerative changes of the cervical spine as well as a visualized upper thoracic spine, likely age-related. .     1. Negative CTA exam of the major cervical arterial vasculature for significant stenosis or occlusion. 2. Negative CTA exam of the major intracranial arterial vasculature for significant proximal vessel stenosis, occlusion, or aneurysms. 3. Right maxillary sinus disease, as above. Ct Code Neuro Head Wo Contrast    Result Date: 4/18/2021  CT of the head without contrast. CLINICAL INDICATION: Weakness, unsteadiness, leaning to the right, code stroke PROCEDURE: Serial thin section axial images are obtained from the cranial vertex through the skull base without the administration of intravenous contrast. Radiation dose reduction techniques were used for this study. Our CT scanners use one or all of the following: Automated exposure control, adjusted of the mA and/or kV according to patient size, iterative reconstruction COMPARISON: No prior. FINDINGS: There is no acute intracranial hemorrhage, mass, or mass effect. No abnormal extra-axial fluid collections identified. There is no hydrocephalus. The basilar cisterns are widely patent. Mild periventricular white matter hypoattenuation is noted.  Otherwise, the gray-white matter brain parenchymal interface is well-maintained. No findings present to indicate large, cortical-based territorial infarction. No skull fracture or aggressive osseous lesion noted. The mastoid air cells and included paranasal sinuses are clear. 1. No acute intracranial abnormality. 2. Mild chronic white matter microangiopathic changes. Results for orders placed or performed during the hospital encounter of 21   2D ECHO COMPLETE ADULT (TTE) W OR [address] [facility]  [address] [facility], [address] [facility]  [phone]    Transthoracic Echocardiogram  2D, M-mode, Doppler, and Color Doppler    Patient: Ruperto Cano  MR #: [id number]  : 1935  Age: 82 years  Gender: Male  Study date: 2021  Account #: [de-identified]  Height: 71 in  Weight: 189.6 lb  BSA: 2.06 mï¾²  Status:Routine  Location: [facility]  BP: 138/ 88    Allergies: NO KNOWN ALLERGENS    Sonographer:  Angela Gooden [facility]  Group:  [id number] Cardiology  Reading Physician:  MILAD Villasenor MD [facility]    INDICATIONS: TIA    PROCEDURE: This was a routine study. A transthoracic echocardiogram was  performed. The study included complete 2D imaging, M-mode, complete spectral  Doppler, and color Doppler. Intravenous contrast (agitated saline) was  administered. Image quality was adequate. LEFT VENTRICLE: Septal hypokinesis noted. Size was normal. Systolic function  was mildly reduced. Ejection fraction was estimated in the range of 40 % to   45  %. There is frequent bigeminy noted throughout the study. There were no  regional wall motion abnormalities. There was moderate concentric   hypertrophy. The study was not technically sufficient to allow evaluation of LV diastolic  function. RIGHT VENTRICLE: The size was normal. Systolic function was normal. The  tricuspid jet envelope definition was inadequate for estimation of RV   systolic  pressure. LEFT ATRIUM: The atrium was moderately dilated.     ATRIAL SEPTUM: There was no left-to-right shunt and no right-to-left shunt. RIGHT ATRIUM: Size was normal.    SYSTEMIC VEINS: IVC: The inferior vena cava was normal in size and course. AORTIC VALVE: The valve was structurally normal, tri-commissural. Leaflets  exhibited mild sclerosis. There was no evidence for stenosis. There was   trivial  regurgitation. MITRAL VALVE: At least moderate eccentric mitral regurgitation noted,   severity  might be underestimated due to heart rate. There appear to be two jets, one  very anteromedially directed and another posterolaterally directed. There was  mild annular calcification. Valve structure was normal. There was no evidence  for stenosis. TRICUSPID VALVE: The valve structure was normal. There was no evidence for  stenosis. There was mild regurgitation. PULMONIC VALVE: The valve structure was normal. There was no evidence for  stenosis. There was no insufficiency. PERICARDIUM: There was no pericardial effusion. AORTA: The root exhibited normal size. SUMMARY:    -  Left ventricle: Septal hypokinesis noted. Systolic function was mildly  reduced. Ejection fraction was estimated in the range of 40 % to 45 %. There   is  frequent bigeminy noted throughout the study. There were no regional wall  motion abnormalities. There was moderate concentric hypertrophy. The study   was  not technically sufficient to allow evaluation of LV diastolic function. -  Left atrium: The atrium was moderately dilated. -  Atrial septum: There was no left-to-right shunt and no right-to-left   shunt.    -  Aortic valve: The valve was structurally normal, tri-commissural. Leaflets  exhibited mild sclerosis. There was trivial regurgitation.    -  Mitral valve: At least moderate eccentric mitral regurgitation noted,  severity might be underestimated due to heart rate. There appear to be two  jets, one very anteromedially directed and another posterolaterally directed.   There was mild annular calcification.    -  Tricuspid valve: There was mild regurgitation. SYSTEM MEASUREMENT TABLES    2D mode  AoR Diam (2D): 3.3 cm  LA Dimension (2D): 4.7 cm  Left Atrium Systolic Volume Index; Method of Disks, Biplane; 2D mode;: 66   ml/m2  IVS/LVPW (2D): 1  IVSd (2D): 1.4 cm  LVIDd (2D): 4.8 cm  LVIDs (2D): 3.7 cm  LVPWd (2D): 1.4 cm  RVIDd (2D): 2.8 cm    Unspecified Scan Mode  Peak Grad; Mean; Antegrade Flow: 10 mm[Hg]  Vmax; Antegrade Flow: 156 cm/s    Prepared and signed by    MILAD Pisano MD [facility]  Signed 19-Apr-2021 15:24:29         Cultures:   All Micro Results     None          Assessment:     Primary Diagnosis: Lumbar spinal stenosis    Hospital Problems as of 4/20/2021 Never Reviewed          Codes Class Noted - Resolved POA    * (Principal) Lumbar spinal stenosis ICD-10-CM: M48.061  ICD-9-CM: 724.02  4/20/2021 - Present Yes        Urinary incontinence ICD-10-CM: R32  ICD-9-CM: 788.30  4/18/2021 - Present Yes        Unsteady gait ICD-10-CM: R26.81  ICD-9-CM: 781.2  4/18/2021 - Present Yes        Prostate cancer metastatic to bone [facility]) ICD-10-CM: C61, C79.51  ICD-9-CM: 185, 198.5  4/20/2021 - Present Yes        Diabetes mellitus type 2, controlled ([facility] ICD-10-CM: E11.9  ICD-9-CM: 250.00  4/19/2021 - Present Yes        Hypertension ICD-10-CM: I10  ICD-9-CM: 401.9  4/19/2021 - Present Yes        Atrial fibrillation ([facility] ICD-10-CM: I48.91  ICD-9-CM: 427.31  4/19/2021 - Present Yes        CHF (congestive heart failure) ([facility] ICD-10-CM: I50.9  ICD-9-CM: 428.0  4/19/2021 - Present Yes                Discharge Plan:     TBD    Signed By: Ban Trevino DO     April 20, 2021

## 2021-04-20 NOTE — PROGRESS NOTES
Rounding done every hour. Patient resting in room. No signs or symptoms of distress. Patient states he is feeling much better this am. Reports still has tingling noted to right and left hand. Denies tingling or numbness to BLE. Patient denies any further needs or pain at this time. Date 04/19/21 0700 - 04/20/21 0659 04/20/21 0700 - 04/21/21 0659   Shift [id number] 1900-0659 24 Hour Total 2217-2192 6794-1886 24 Hour Total   INTAKE   P.O.  240 240        P. O.  240 240      Shift Total(mL/kg)  240(2.6) 240(2.6)      OUTPUT   Urine(mL/kg/hr) 900(0.8)  900        Urine Voided 900  900      Stool           Stool Occurrence(s) 0 x  0 x      Shift Total(mL/kg) 900(9.9)  900(9.9)      NET -900 240 -660      Weight (kg) 90.7 91.2 91.2 91.2 91.2 91.2

## 2021-04-20 NOTE — PROGRESS NOTES
04/20/21 1854   NIH Stroke Scale   Interval Other (comment)   LOC 0   LOC Questions 0   LOC Commands 0   Best Gaze 0   Visual 0   Facial Palsy 0   Motor Right Arm 0   Motor Left Arm 0   Motor Right Leg 0   Motor Left Leg 0   Limb Ataxia 0   Sensory 0   Best Language 0   Dysarthria 0   Extinction and Inattention 0   Total 0

## 2021-04-20 NOTE — PROGRESS NOTES
04/20/21 0704   NIH Stroke Scale   Interval Other (comment)   LOC 0   LOC Questions 0   LOC Commands 0   Best Gaze 0   Visual 0   Facial Palsy 0   Motor Right Arm 0   Motor Left Arm 0   Motor Right Leg 0   Motor Left Leg 0   Limb Ataxia 0   Sensory 0   Best Language 0   Dysarthria 0   Extinction and Inattention 0   Total 0

## 2021-04-20 NOTE — PROGRESS NOTES
Problem: Falls - Risk of  Goal: *Absence of Falls  Description: Document Sung Fall Risk and appropriate interventions in the flowsheet. Outcome: Progressing Towards Goal  Note: Fall Risk Interventions:  Mobility Interventions: Bed/chair exit alarm         Medication Interventions: Patient to call before getting OOB, Teach patient to arise slowly    Elimination Interventions: Bed/chair exit alarm              Problem: Patient Education: Go to Patient Education Activity  Goal: Patient/Family Education  Outcome: Progressing Towards Goal     Problem: Diabetes Self-Management  Goal: *Disease process and treatment process  Description: Define diabetes and identify own type of diabetes; list 3 options for treating diabetes. Outcome: Progressing Towards Goal  Goal: *Incorporating nutritional management into lifestyle  Description: Describe effect of type, amount and timing of food on blood glucose; list 3 methods for planning meals. Outcome: Progressing Towards Goal  Goal: *Incorporating physical activity into lifestyle  Description: State effect of exercise on blood glucose levels. Outcome: Progressing Towards Goal  Goal: *Developing strategies to promote health/change behavior  Description: Define the ABC's of diabetes; identify appropriate screenings, schedule and personal plan for screenings. Outcome: Progressing Towards Goal  Goal: *Using medications safely  Description: State effect of diabetes medications on diabetes; name diabetes medication taking, action and side effects. Outcome: Progressing Towards Goal  Goal: *Monitoring blood glucose, interpreting and using results  Description: Identify recommended blood glucose targets  and personal targets. Outcome: Progressing Towards Goal  Goal: *Prevention, detection, treatment of acute complications  Description: List symptoms of hyper- and hypoglycemia; describe how to treat low blood sugar and actions for lowering  high blood glucose level.   Outcome: Progressing Towards Goal  Goal: *Prevention, detection and treatment of chronic complications  Description: Define the natural course of diabetes and describe the relationship of blood glucose levels to long term complications of diabetes.   Outcome: Progressing Towards Goal  Goal: *Developing strategies to address psychosocial issues  Description: Describe feelings about living with diabetes; identify support needed and support network  Outcome: Progressing Towards Goal  Goal: *Insulin pump training  Outcome: Progressing Towards Goal  Goal: *Sick day guidelines  Outcome: Progressing Towards Goal  Goal: *Patient Specific Goal (EDIT GOAL, INSERT TEXT)  Outcome: Progressing Towards Goal     Problem: Patient Education: Go to Patient Education Activity  Goal: Patient/Family Education  Outcome: Progressing Towards Goal     Problem: Patient Education: Go to Patient Education Activity  Goal: Patient/Family Education  Outcome: Progressing Towards Goal     Problem: TIA/CVA Stroke: 0-24 hours  Goal: Off Pathway (Use only if patient is Off Pathway)  Outcome: Progressing Towards Goal  Goal: Activity/Safety  Outcome: Progressing Towards Goal  Goal: Consults, if ordered  Outcome: Progressing Towards Goal  Goal: Diagnostic Test/Procedures  Outcome: Progressing Towards Goal  Goal: Nutrition/Diet  Outcome: Progressing Towards Goal  Goal: Discharge Planning  Outcome: Progressing Towards Goal  Goal: Medications  Outcome: Progressing Towards Goal  Goal: Respiratory  Outcome: Progressing Towards Goal  Goal: Treatments/Interventions/Procedures  Outcome: Progressing Towards Goal  Goal: Minimize risk of bleeding post-thrombolytic infusion  Outcome: Progressing Towards Goal  Goal: Monitor for complications post-thrombolytic infusion  Outcome: Progressing Towards Goal  Goal: Psychosocial  Outcome: Progressing Towards Goal  Goal: *Hemodynamically stable  Outcome: Progressing Towards Goal  Goal: *Neurologically stable  Description: Absence of additional neurological deficits    Outcome: Progressing Towards Goal  Goal: *Verbalizes anxiety and depression are reduced or absent  Outcome: Progressing Towards Goal  Goal: *Absence of Signs of Aspiration on Current Diet  Outcome: Progressing Towards Goal  Goal: *Absence of deep venous thrombosis signs and symptoms(Stroke Metric)  Outcome: Progressing Towards Goal  Goal: *Ability to perform ADLs and demonstrates progressive mobility and function  Outcome: Progressing Towards Goal  Goal: *Stroke education started(Stroke Metric)  Outcome: Progressing Towards Goal  Goal: *Dysphagia screen performed(Stroke Metric)  Outcome: Progressing Towards Goal  Goal: *Rehab consulted(Stroke Metric)  Outcome: Progressing Towards Goal     Problem: TIA/CVA Stroke: Day 2 Until Discharge  Goal: Off Pathway (Use only if patient is Off Pathway)  Outcome: Progressing Towards Goal  Goal: Activity/Safety  Outcome: Progressing Towards Goal  Goal: Diagnostic Test/Procedures  Outcome: Progressing Towards Goal  Goal: Nutrition/Diet  Outcome: Progressing Towards Goal  Goal: Discharge Planning  Outcome: Progressing Towards Goal  Goal: Medications  Outcome: Progressing Towards Goal  Goal: Respiratory  Outcome: Progressing Towards Goal  Goal: Treatments/Interventions/Procedures  Outcome: Progressing Towards Goal  Goal: Psychosocial  Outcome: Progressing Towards Goal  Goal: *Verbalizes anxiety and depression are reduced or absent  Outcome: Progressing Towards Goal  Goal: *Absence of aspiration  Outcome: Progressing Towards Goal  Goal: *Absence of deep venous thrombosis signs and symptoms(Stroke Metric)  Outcome: Progressing Towards Goal  Goal: *Optimal pain control at patient's stated goal  Outcome: Progressing Towards Goal  Goal: *Tolerating diet  Outcome: Progressing Towards Goal  Goal: *Ability to perform ADLs and demonstrates progressive mobility and function  Outcome: Progressing Towards Goal  Goal: *Stroke education continued(Stroke Metric)  Outcome: Progressing Towards Goal     Problem: Ischemic Stroke: Discharge Outcomes  Goal: *Verbalizes anxiety and depression are reduced or absent  Outcome: Progressing Towards Goal  Goal: *Verbalize understanding of risk factor modification(Stroke Metric)  Outcome: Progressing Towards Goal  Goal: *Hemodynamically stable  Outcome: Progressing Towards Goal  Goal: *Absence of aspiration pneumonia  Outcome: Progressing Towards Goal  Goal: *Aware of needed dietary changes  Outcome: Progressing Towards Goal  Goal: *Verbalize understanding of prescribed medications including anti-coagulants, anti-lipid, and/or anti-platelets(Stroke Metric)  Outcome: Progressing Towards Goal  Goal: *Tolerating diet  Outcome: Progressing Towards Goal  Goal: *Aware of follow-up diagnostics related to anticoagulants  Outcome: Progressing Towards Goal  Goal: *Ability to perform ADLs and demonstrates progressive mobility and function  Outcome: Progressing Towards Goal  Goal: *Absence of DVT(Stroke Metric)  Outcome: Progressing Towards Goal  Goal: *Absence of aspiration  Outcome: Progressing Towards Goal  Goal: *Optimal pain control at patient's stated goal  Outcome: Progressing Towards Goal  Goal: *Home safety concerns addressed  Outcome: Progressing Towards Goal  Goal: *Describes available resources and support systems  Outcome: Progressing Towards Goal  Goal: *Verbalizes understanding of activation of EMS(911) for stroke symptoms(Stroke Metric)  Outcome: Progressing Towards Goal  Goal: *Understands and describes signs and symptoms to report to providers(Stroke Metric)  Outcome: Progressing Towards Goal  Goal: *Neurolgocially stable (absence of additional neurological deficits)  Outcome: Progressing Towards Goal  Goal: *Verbalizes importance of follow-up with primary care physician(Stroke Metric)  Outcome: Progressing Towards Goal  Goal: *Smoking cessation discussed,if applicable(Stroke Metric)  Outcome: Progressing Towards Goal  Goal: *Depression screening completed(Stroke Metric)  Outcome: Progressing Towards Goal

## 2021-04-21 VITALS
RESPIRATION RATE: 19 BRPM | WEIGHT: 201.1 LBS | BODY MASS INDEX: 28.79 KG/M2 | TEMPERATURE: 97.8 F | HEIGHT: 70 IN | SYSTOLIC BLOOD PRESSURE: 135 MMHG | HEART RATE: 67 BPM | DIASTOLIC BLOOD PRESSURE: 83 MMHG | OXYGEN SATURATION: 94 %

## 2021-04-21 PROBLEM — M48.061 SPINAL STENOSIS OF LUMBAR REGION: Status: ACTIVE | Noted: 2021-04-21

## 2021-04-21 LAB
GLUCOSE BLD STRIP.AUTO-MCNC: 128 MG/DL (ref 65–100)
GLUCOSE BLD STRIP.AUTO-MCNC: 344 MG/DL (ref 65–100)
SERVICE CMNT-IMP: ABNORMAL
SERVICE CMNT-IMP: ABNORMAL

## 2021-04-21 PROCEDURE — 97161 PT EVAL LOW COMPLEX 20 MIN: CPT

## 2021-04-21 PROCEDURE — 65660000000 HC RM CCU STEPDOWN

## 2021-04-21 PROCEDURE — 99232 SBSQ HOSP IP/OBS MODERATE 35: CPT | Performed by: ORTHOPAEDIC SURGERY

## 2021-04-21 PROCEDURE — 74011250637 HC RX REV CODE- 250/637: Performed by: FAMILY MEDICINE

## 2021-04-21 PROCEDURE — 74011636637 HC RX REV CODE- 636/637: Performed by: FAMILY MEDICINE

## 2021-04-21 PROCEDURE — 97165 OT EVAL LOW COMPLEX 30 MIN: CPT

## 2021-04-21 PROCEDURE — 97112 NEUROMUSCULAR REEDUCATION: CPT

## 2021-04-21 PROCEDURE — 99218 HC RM OBSERVATION: CPT

## 2021-04-21 PROCEDURE — 82962 GLUCOSE BLOOD TEST: CPT

## 2021-04-21 PROCEDURE — 97530 THERAPEUTIC ACTIVITIES: CPT

## 2021-04-21 RX ORDER — GUAIFENESIN 100 MG/5ML
81 LIQUID (ML) ORAL DAILY
Qty: 30 TAB | Refills: 2 | Status: SHIPPED | OUTPATIENT
Start: 2021-04-22

## 2021-04-21 RX ADMIN — METOPROLOL TARTRATE 50 MG: 50 TABLET, FILM COATED ORAL at 09:38

## 2021-04-21 RX ADMIN — DILTIAZEM HYDROCHLORIDE 240 MG: 120 CAPSULE, COATED, EXTENDED RELEASE ORAL at 09:37

## 2021-04-21 RX ADMIN — Medication 500 MG: at 09:37

## 2021-04-21 RX ADMIN — FUROSEMIDE 40 MG: 40 TABLET ORAL at 09:37

## 2021-04-21 RX ADMIN — INSULIN LISPRO 8 UNITS: 100 INJECTION, SOLUTION INTRAVENOUS; SUBCUTANEOUS at 11:14

## 2021-04-21 RX ADMIN — Medication 10 ML: at 05:41

## 2021-04-21 RX ADMIN — LEVOTHYROXINE SODIUM 200 MCG: 0.1 TABLET ORAL at 05:40

## 2021-04-21 RX ADMIN — ASPIRIN 81 MG: 81 TABLET, CHEWABLE ORAL at 09:37

## 2021-04-21 RX ADMIN — DABIGATRAN ETEXILATE MESYLATE 150 MG: 150 CAPSULE ORAL at 09:37

## 2021-04-21 RX ADMIN — ATORVASTATIN CALCIUM 40 MG: 40 TABLET, FILM COATED ORAL at 09:37

## 2021-04-21 NOTE — PROGRESS NOTES
DEVIN was informed by primary nurse, patient's daughter Jose Angel Blunt [phone] requested to speak with TERRELL BELTRAN. CM contacted the patient's daughter this day. Per daughter, family is the process of renovating the patient's home with railing and new palmira. Daughter anticipates the patient to discharge with family in [place], as she feels it is a fall hazard for the patient to return home, due to construction. CM informed daughter, CM is awaiting therapy evaluation, to assist further with discharge planning. Daughter requested discharge date, as the patient is ready to discharge, per daughter. CM informed daughter, discharge date TBD. CM will discuss concerns with attending DO. DEVIN continues to follow.

## 2021-04-21 NOTE — PROGRESS NOTES
04/21/21 0716   NIH Stroke Scale   Interval Other (comment)   LOC 0   LOC Questions 0   LOC Commands 0   Best Gaze 0   Visual 0   Facial Palsy 0   Motor Right Arm 0   Motor Left Arm 0   Motor Right Leg 0   Motor Left Leg 0   Limb Ataxia 0   Sensory 0   Best Language 0   Dysarthria 0   Extinction and Inattention 0   Total 0

## 2021-04-21 NOTE — CONSULTS
Name: Lucas Parker  YOB: 1935  Gender: male  MRN: [id number]  Age: 82 y.o. Chief Complaint: Diziness    History of Present Illness: This is a very pleasant 82 y.o. male with a 15 year history of prostate CA who has a recent history of dizziness. The onset of the symptoms was rather insidious. He  denies any change in bowel or bladder function since the onset of the symptoms. He denies back pain, weakness or numbness.       Medications:       Current Facility-Administered Medications:     metoprolol tartrate (LOPRESSOR) tablet 50 mg, 50 mg, Oral, Q12H, Chuck Smith MD, 50 mg at 04/20/21 2114    [Held by provider] dabigatran etexilate (PRADAXA) capsule 150 mg, 150 mg, Oral, Q12H, Maria Alejandra Harmon MD, Stopped at 04/20/21 0900    dilTIAZem ER (CARDIZEM CD) capsule 240 mg, 240 mg, Oral, DAILY, Maria Alejandra Harmon MD, Stopped at 04/20/21 0900    aspirin chewable tablet 81 mg, 81 mg, Oral, DAILY, Chuck Smith MD, 81 mg at 04/20/21 0847    ascorbic acid (vitamin C) (VITAMIN C) tablet 500 mg, 500 mg, Oral, DAILY, Chuck Smith MD, 500 mg at 04/20/21 0900    atorvastatin (LIPITOR) tablet 40 mg, 40 mg, Oral, DAILY, Maria Alejandra Harmon MD, 40 mg at 04/20/21 0847    enzalutamide (XTANDI) chemo capsule 160 mg (Patient Supplied), 160 mg, Oral, DAILY, Maria Alejandra Harmon MD, Stopped at 04/20/21 0900    furosemide (LASIX) tablet 40 mg, 40 mg, Oral, DAILY, Maria Alejandra Harmon MD, 40 mg at 04/20/21 0847    levothyroxine (SYNTHROID) tablet 200 mcg, 200 mcg, Oral, ACB, Maria Alejandra Harmon MD, 200 mcg at 04/21/21 0540    sodium chloride (NS) flush 5-40 mL, 5-40 mL, IntraVENous, Q8H, Chuck Smith MD, 10 mL at 04/21/21 0541    sodium chloride (NS) flush 5-40 mL, 5-40 mL, IntraVENous, PRN, Maria Alejandra Harmon MD    labetaloL (NORMODYNE;TRANDATE) injection 5 mg, 5 mg, IntraVENous, Q10MIN PRN, Maria Alejandra Harmon MD    insulin lispro (HUMALOG) injection, , SubCUTAneous, AC&HS, Maria Alejandra Harmon MD, 6 Units at 04/20/21 2115    Allergies:  No Known Allergies      Physical Exam:     This is a well developed well nourished adult in no acute distress. Mood and affect are appropriate. Oriented to person, place, and time. Respirations are unlabored and there is no evidence of cyanosis    Examination of the lumbar spine reveals no significantly imbalanced sagittal or coronal plane deformity. Straight leg testing is negative. There is minimal hip irritability with internal or external rotation bilaterally. Sensory testing reveals intact sensation to light touch and in the distribution of the L3-S1 dermatomes bilaterally. Ankle jerk is negative for clonus    Strength testing in the lower extremity reveals the following based on the 5 point grading scale:     HF (L2) H Ab (L5) KE (L3/4) ADF (L4) EHL (L5) A Ev (S1) APF (S1)   Right 5 5 5 5 5 5 5   Left 5 5 5 5 5 5 5     The feet are warm with good capillary refill and palpable pedal pulses. Radiographic Studies:     MRI of the thoracic and lumbar spine images were reviewed and interpreted: no significant thoracic stenosis. L3-4 arthropathy with superimposed disc herniation causes moderate stenosis. Diagnosis:      ICD-10-CM ICD-9-CM    1. Dizziness  R42 780.4    2. Ataxia  R27.0 781.3    3. Urinary incontinence, unspecified type  R32 788.30        Assessment/Plan: This patient's clinical history and physical exam is not consistent with lumbar radiculopathy, neurogenic claudication, nor cauda equina syndrome. I do not think he is experiencing much in the way of symptoms from his lumbar pathology. There is no suggestion of myelppathy or cervical radiculopathy on exam, so I do not feel that the cervical spine is involved either. I would not recommend spinal surgery based on these findings.              Troy Wetzel MD    04/21/21  7:37 AM

## 2021-04-21 NOTE — PROGRESS NOTES
ACUTE OT GOALS:  (Developed with and agreed upon by patient and/or caregiver.)  1. Patient will complete lower body bathing and dressing with modified independence and adaptive equipment as needed. 2. Patient will complete toileting with modified independence. 3. Patient will tolerate 30 minutes of OT treatment with 1-2 rest breaks to increase activity tolerance for ADLs. 4. Patient will complete functional transfers with modified independence and adaptive equipment as needed. 5. Patient will complete functional mobility for household distances with modified independence and good safety awareness. Timeframe: 7 visits       OCCUPATIONAL THERAPY ASSESSMENT: Initial Assessment and Daily Note OT Treatment Day # 1    Emmy Grimes is a 82 y.o. male   PRIMARY DIAGNOSIS: Lumbar spinal stenosis  Unsteady gait [R26.81]  Urinary incontinence [R32]  Spinal stenosis of lumbar region [M48.061]       Reason for Referral:    ICD-10: Treatment Diagnosis: Generalized Muscle Weakness (M62.81)  Other lack of cordination (R27.8)  Low Back Pain (M54.5)  [place] / [place] / Product Type: Medicare /   ASSESSMENT:     REHAB RECOMMENDATIONS:   Recommendation to date pending progress:  [address] [place] [address] is for patient to stay with son in [place] for 1 week  Equipment:    None     PRIOR LEVEL OF FUNCTION:  (Prior to Hospitalization)  INITIAL/CURRENT LEVEL OF FUNCTION:  (Based on today's evaluation)   Bathing:   Independent  Dressing:   Independent  Feeding/Grooming:   Independent  Toileting:   Independent  Functional Mobility:   Independent Bathing:   Contact Guard Assistance  Dressing:   Contact Guard Assistance  Feeding/Grooming:   Set Up  Toileting:   Standby Assistance  Functional Mobility:   Contact Guard Assistance     ASSESSMENT:  Mr. Mouna Francis presents to the hospital unsteady gait, urinary incontinence, and spinal stenosis of the lumbar region.  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 Ortho saw patient and is not recommending any surgery at this time. Pt denies any pain and reports he is ready to go home. Pt is a bit unsteady and impulsive at times with functional mobility. Pt needs cues to slow down. It was noted that pt was scuffing R foot with functional mobility with decreased step on that R side. Pt did improve with functional mobility once out in the hallway. Pt worked on getting on/off toilet and transferring in out of shower. Pt is somewhat a poor historian and was having difficulty with recalling information and gathering thoughts at times. Pt has multiple children that are supportive. Pt is going to [place] to stay with his son that works from home for 1 week. Pt is currently functioning below baseline and will benefit from OT services to address stated goals and plan of care. SUBJECTIVE:   Mr. Brandon Rojas states, \"I'm fine to go home by myself. \"    SOCIAL HISTORY/LIVING ENVIRONMENT: Lives alone but is having family friends rent the upstairs of his home. Pt currently having basement apartment renovated and that is where he will stay. Pt reports he is typically independent with ADL/functional mobility but does not drive. Pt has a cane he uses occasionally.    Home Environment: Private residence  # Steps to Enter: 0  One/Two Story Residence: One story  Living Alone: No  Support Systems: Child(bernard), Family member(s)    OBJECTIVE:     PAIN: VITAL SIGNS: LINES/DRAINS:   Pre Treatment: Pain Screen  Pain Scale 1: Numeric (0 - 10)  Pain Intensity 1: 0  Post Treatment: 0   none  O2 Device: None (Room air)     GROSS EVALUATION:  B UE Within Functional Limits Abnormal/ Functional Abnormal/ Non-Functional (see comments) Not Tested Comments:   AROM [x] [] [] []    PROM [x] [] [] []    Strength [] [x] [] []    Balance [] [x] [] [] Good sitting; fair standing    Posture [x] [] [] []    Sensation [x] [] [] []    Coordination [] [x] [] []    Tone [x] [] [] [] Edema [x] [] [] []    Activity Tolerance [] [x] [] []     [] [] [] []      COGNITION/  PERCEPTION: Intact Impaired   (see comments) Comments:   Orientation [x] []    Vision [] [x] Ongoing blurry vision    Hearing [x] []    Judgment/ Insight [] [x]    Attention [] [x]    Memory [] [x]    Command Following [x] []    Emotional Regulation [x] []     [] []      ACTIVITIES OF DAILY LIVING: I Mod I S SBA CGA Min Mod Max Total NT Comments   BASIC ADLs:              Bathing/ Showering [] [] [] [] [] [] [] [] [] [x]    Toileting [] [] [] [] [] [] [] [] [] [x]    Dressing [] [] [] [] [] [] [] [] [] [x]    Feeding [] [] [] [] [] [] [] [] [] [x]    Grooming [] [] [] [] [] [] [] [] [] []    Personal Device Care [] [] [] [] [] [] [] [] [] [x]    Functional Mobility [] [] [] [] [x] [] [] [] [] []    I=Independent, Mod I=Modified Independent, S=Supervision, SBA=Standby Assistance, CGA=Contact Guard Assistance,   Min=Minimal Assistance, Mod=Moderate Assistance, Max=Maximal Assistance, Total=Total Assistance, NT=Not Tested    MOBILITY: I Mod I S SBA CGA Min Mod Max Total  NT x2 Comments:   Supine to sit [] [] [x] [] [] [] [] [] [] [] []    Sit to supine [] [] [] [] [] [] [] [] [] [x] []    Sit to stand [] [] [] [] [x] [] [] [] [] [] []    Bed to chair [] [] [] [] [x] [] [] [] [] [] []    I=Independent, Mod I=Modified Independent, S=Supervision, SBA=Standby Assistance, CGA=Contact Guard Assistance,   Min=Minimal Assistance, Mod=Moderate Assistance, Max=Maximal Assistance, Total=Total Assistance, NT=Not Tested    [employer] 6 Clicks   Daily Activity Inpatient Short Form        How much help from another person does the patient currently need. .. Total A Lot A Little None   1. Putting on and taking off regular lower body clothing? [] 1   [] 2   [x] 3   [] 4   2. Bathing (including washing, rinsing, drying)? [] 1   [] 2   [x] 3   [] 4   3.   Toileting, which includes using toilet, bedpan or urinal?   [] 1   [] 2 [x] 3   [] 4   4. Putting on and taking off regular upper body clothing? [] 1   [] 2   [x] 3   [] 4   5. Taking care of personal grooming such as brushing teeth? [] 1   [] 2   [x] 3   [] 4   6. Eating meals? [] 1   [] 2   [] 3   [x] 4   © 2007, Trustees of [address] [place] [id number], under license to [employer]. All rights reserved     Score:  Initial: 19 Most Recent: X (Date: -- )   Interpretation of Tool:  Represents activities that are increasingly more difficult (i.e. Bed mobility, Transfers, Gait). PLAN:   FREQUENCY/DURATION: OT Plan of Care: 3 times/week for duration of hospital stay or until stated goals are met, whichever comes first.    PROBLEM LIST:   (Skilled intervention is medically necessary to address:)  1. Decreased ADL/Functional Activities  2. Decreased Activity Tolerance  3. Decreased AROM/PROM  4. Decreased Balance  5. Decreased Cognition  6. Decreased Coordination  7. Decreased Gait Ability  8. Decreased Strength  9. Decreased Transfer Abilities  10. Increased Pain   INTERVENTIONS PLANNED:   (Benefits and precautions of occupational therapy have been discussed with the patient.)  1. Self Care Training  2. Therapeutic Activity  3. Therapeutic Exercise/HEP  4. Neuromuscular Re-education  5. Education     TREATMENT:     EVALUATION: Low Complexity : (Untimed Charge)    TREATMENT:   ( $$ Neuromuscular Re-Education: 8-22 mins   )  Co-Treatment PT/OT necessary due to patient's decreased overall endurance/tolerance levels, as well as need for high level skilled assistance to complete functional transfers/mobility and functional tasks  Neuromuscular Re-education (8 Minutes): Neuromuscular Re-education included Balance Training, Coordination training and Standing balance training to improve Balance, Coordination, Functional Mobility, Postural Control and functional transfers in the bathroom.     TREATMENT GRID:  N/A    AFTER TREATMENT POSITION/PRECAUTIONS:  Chair, Needs within reach and RN notified    INTERDISCIPLINARY COLLABORATION:  RN/PCT, PT/PTA and OT/MANN    TOTAL TREATMENT DURATION:  OT Patient Time In/Time Out  Time In: 1103  Time Out: [address]1002 [facility],

## 2021-04-21 NOTE — DISCHARGE INSTRUCTIONS
DISCHARGE SUMMARY from Nurse    PATIENT INSTRUCTIONS:    After general anesthesia or intravenous sedation, for 24 hours or while taking prescription Narcotics:  · Limit your activities  · Do not drive and operate hazardous machinery  · Do not make important personal or business decisions  · Do  not drink alcoholic beverages  · If you have not urinated within 8 hours after discharge, please contact your surgeon on call. Report the following to your surgeon:  · Excessive pain, swelling, redness or odor of or around the surgical area  · Temperature over 100.5  · Nausea and vomiting lasting longer than 4 hours or if unable to take medications  · Any signs of decreased circulation or nerve impairment to extremity: change in color, persistent  numbness, tingling, coldness or increase pain  · Any questions    What to do at Home:  Recommended activity: No lifting, Driving, or Strenuous exercise until cleared by Dr Monique Hatchet,     If you experience any of the symptoms listed above, please follow up with your doctor. *  Please give a list of your current medications to your Primary Care Provider. *  Please update this list whenever your medications are discontinued, doses are      changed, or new medications (including over-the-counter products) are added. *  Please carry medication information at all times in case of emergency situations. These are general instructions for a healthy lifestyle:    No smoking/ No tobacco products/ Avoid exposure to second hand smoke  Surgeon General's Warning:  Quitting smoking now greatly reduces serious risk to your health.     Obesity, smoking, and sedentary lifestyle greatly increases your risk for illness    A healthy diet, regular physical exercise & weight monitoring are important for maintaining a healthy lifestyle    You may be retaining fluid if you have a history of heart failure or if you experience any of the following symptoms:  Weight gain of 3 pounds or more overnight or 5 pounds in a week, increased swelling in our hands or feet or shortness of breath while lying flat in bed. Please call your doctor as soon as you notice any of these symptoms; do not wait until your next office visit. The discharge information has been reviewed with the patient. The patient verbalized understanding. Discharge medications reviewed with the patient and appropriate educational materials and side effects teaching were provided. ___________________________________________________________________________________________________________________________________      Stroke: After Your Visit     Your Care Instructions     Risk factors for stroke include being overweight, smoking, and sedentary lifestyle. This means that the blood flow to a part of your brain was blocked for some time, which damages the nerve cells in that part of the brain. The part of your body controlled by that part of your brain may not function properly now. The brain is an amazing organ that can heal itself to some degree. The stroke you had damaged part of your brain, but other parts of your brain may take over in some way for the damaged areas. You have already started this process. Going home may be hard for you and your family. The more you can try to do for yourself, the better. Remember to take each day one at a time. Follow-up care is a key part of your treatment and safety. Be sure to make and go to all appointments, and call your doctor if you are having problems. Its also a good idea to know your test results and keep a list of the medicines you take. How can you care for yourself at home? Enter a stroke rehabilitation (rehab) program, if your doctor recommends it. Physical, speech, and occupational therapies can help you manage bathing, dressing, eating, and other basics of daily living. Eat a heart-healthy diet that is low in cholesterol, saturated fat, and salt.  Eat lots of fresh fruits and vegetables and foods high in fiber. Increase your activities slowly. Take short rest breaks when you get tired. Gradually increase the amount you walk. Start out by walking a little more than you did the day before. Do not drive until your doctor says it is okay. It is normal to feel sad or depressed after a stroke. If the blues last, talk to your doctor. If you are having problems with urine leakage, go to the bathroom at regular times, including when you first wake up and at bedtime. Also, limit fluids after dinner. If you are constipated, drink plenty of fluids, enough so that your urine is light yellow or clear like water. If you have kidney, heart, or liver disease and have to limit fluids, talk with your doctor before you increase the amount of fluids you drink. Set up a regular time for using the toilet. If you continue to have constipation, your doctor may suggest using a bulking agent, such as Metamucil, or a stool softener, laxative, or enema. Medicines  Take your medicines exactly as prescribed. Call your doctor if you think you are having a problem with your medicine. You may be taking several medicines. ACE (angiotensin-converting enzyme) inhibitors, angiotensin II receptor blockers (ARBs), beta-blockers, diuretics (water pills), and calcium channel blockers control your blood pressure. Statins help lower cholesterol. Your doctor may also prescribe medicines for depression, pain, sleep problems, anxiety, or agitation. If your doctor has given you medicine that prevents blood clots, such as warfarin (Coumadin), aspirin combined with extended-release dipyridamole (Aggrenox), clopidogrel (Plavix), or aspirin to prevent another stroke, you should:  Tell your dentist, pharmacist, and other health professionals that you take these medicines. Watch for unusual bruising or bleeding, such as blood in your urine, red or black stools, or bleeding from your nose or gums.   Get regular blood tests to check your clotting time if you are taking Coumadin. Wear medical alert jewelry that says you take blood thinners. You can buy this at most drugstores. Do not take any over-the-counter medicines or herbal products without talking to your doctor first.  If you take birth control pills or hormone replacement therapy, talk to your doctor about whether they are right for you. For family members and caregivers  Make the home safe. Set up a room so that your loved one does not have to climb stairs. Be sure the bathroom is on the same floor. Move throw rugs and furniture that could cause falls, and make sure that the lighting is good. Put grab bars and seats in tubs and showers. Find out what your loved one can do and what he or she needs help with. Try not to do things for your loved one that your loved one can do on his or her own. Help him or her learn and practice new skills. Visit and talk with your loved one often. Try doing activities together that you both enjoy, such as playing cards or board games. Keep in touch with your loved one's friends as much as you can, and encourage them to visit. Take care of yourself. Do not try to do everything yourself. Ask other family members to help. Eat well, get enough rest, and take time to do things that you enjoy. Keep up with your own doctor visits, and make sure to take your medicines regularly. Get out of the house as much as you can. Join a local support group. Find out if you qualify for home health care visits to help with rehab or for adult day care. When should you call for help? Call 911 anytime you think you may need emergency care. For example, call if:  You have signs of another stroke. These may include:  Sudden numbness, paralysis, or weakness in your face, arm, or leg, especially on only one side of your body. New problems with walking or balance. Sudden vision changes. Drooling or slurred speech.   New problems speaking or understanding simple statements, or you feel confused. A sudden, severe headache that is different from past headaches. Call 911 even if these symptoms go away in a few minutes. You cough up blood. You vomit blood or what looks like coffee grounds. You pass maroon or very bloody stools. Call your doctor now or seek immediate medical care if:  You have new bruises or blood spots under your skin. You have a nosebleed. Your gums bleed when you brush your teeth. You have blood in your urine. Your stools are black and tarlike or have streaks of blood. You have vaginal bleeding when you are not having your period, or heavy period bleeding. You have new symptoms that may be related to your stroke, such as falls or trouble swallowing. Watch closely for changes in your health, and be sure to contact your doctor if you have any problems. Where can you learn more? Go to [employer]    Enter C294  in the search box to learn more about \"Stroke: After Your Visit\". © 7393-1474 [employer], [employer]. Care instructions adapted under license by [employer] (which disclaims liability or warranty for this information). This care instruction is for use with your licensed healthcare professional. If you have questions about a medical condition or this instruction, always ask your healthcare professional. Yunior Barraganft any warranty or liability for your use of this information.

## 2021-04-21 NOTE — DISCHARGE SUMMARY
Hospitalist Discharge Summary     Patient ID:  Randall Braga  [id number]  42 y.o.  1935  Admit date: 4/18/2021  Discharge date: 4/21/2021  Attending: Beny Gastelum DO  PCP:  Madie Brar MD  Treatment Team: Attending Provider: Beny Gastelum DO; Consulting Provider: Suzy Jauregui MD; Care Manager: Paola Mohamud; Utilization Review: Rebecca Hernández; Consulting Provider: Wolfgang Lindo MD; Utilization Review: Yenni Borges RN; Consulting Provider: Katrina Mejia NP; Primary Nurse: Hay Leach, RN; Primary Nurse: Sonali Peña RN; Occupational Therapist: Aliza Ramos OT; Physical Therapist: Alivia Moss DPT    Principal Diagnosis Lumbar spinal stenosis    Hospital Problems as of 4/21/2021 Never Reviewed          Codes Class Noted - Resolved POA    * (Principal) Lumbar spinal stenosis ICD-10-CM: M48.061  ICD-9-CM: 724.02  4/20/2021 - Present Yes        Urinary incontinence ICD-10-CM: R32  ICD-9-CM: 788.30  4/18/2021 - Present Yes        Unsteady gait ICD-10-CM: R26.81  ICD-9-CM: 781.2  4/18/2021 - Present Yes        Prostate cancer metastatic to bone [facility]) ICD-10-CM: Sobeidagulshan Schroeder, C79.51  ICD-9-CM: 185, 198.5  4/20/2021 - Present Yes        Diabetes mellitus type 2, controlled ([facility] ICD-10-CM: E11.9  ICD-9-CM: 250.00  4/19/2021 - Present Yes        Hypertension ICD-10-CM: I10  ICD-9-CM: 401.9  4/19/2021 - Present Yes        Atrial fibrillation ([facility] ICD-10-CM: I48.91  ICD-9-CM: 427.31  4/19/2021 - Present Yes        CHF (congestive heart failure) ([facility] ICD-10-CM: I50.9  ICD-9-CM: 428.0  4/19/2021 - Present Yes        Spinal stenosis of lumbar region ICD-10-CM: M48.061  ICD-9-CM: 724.02  4/21/2021 - Present Unknown              Hospital Course:  Please refer to the admission H&P for details of presentation.  In summary, the patient is a 28-year-old male with history of diabetes mellitus, CHF, A. fib on Pradaxa, history of prostate cancer sacral mets, history of thyroid and liver cancer which are in remission admitted for CVA work-up with new onset gait instability and urinary incontinence.  CT head negative for acute pathology.  Telemetry neurology consulted and recommend he is not a TPA candidate due to out of window and he is anticoagulated with Pradaxa.  CTA head and neck negative for major intracranial arterial stenosis or occlusion.  MRI brain with no acute pathology.  MRI thoracic spine without definitive active metastasis.  MRI lumbar spine with L3-4 disc bulge osteophyte complex with bilateral hypertrophic facet arthropathy and a broad-based shallow disc protrusion, central and left parasternal which creates a significant central stenosis. Ortho spine evaluated the patient and said there was no surgical needs. He did well with PT/OT. He was discharged home in stable condition. Significant Diagnostic Studies:    Labs: Results:       Chemistry No results for input(s): GLU, NA, K, CL, CO2, BUN, CREA, CA, AGAP, BUCR, TBIL, AP, TP, ALB, GLOB, AGRAT in the last 72 hours. No lab exists for component: GPT   CBC w/Diff No results for input(s): WBC, RBC, HGB, HCT, PLT, GRANS, LYMPH, EOS, HGBEXT, HCTEXT, PLTEXT in the last 72 hours. Cardiac Enzymes No results for input(s): CPK, CKND1, LITO in the last 72 hours. No lab exists for component: CKRMB, TROIP   Coagulation No results for input(s): PTP, INR, APTT, INREXT in the last 72 hours. Lipid Panel Lab Results   Component Value Date/Time    Cholesterol, total 177 04/19/2021 05:48 AM    HDL Cholesterol 55 04/19/2021 05:48 AM    LDL, calculated 92 04/19/2021 05:48 AM    VLDL, calculated 30 (H) 04/19/2021 05:48 AM    Triglyceride 150 04/19/2021 05:48 AM    CHOL/HDL Ratio 3.2 04/19/2021 05:48 AM      BNP No results for input(s): BNPP in the last 72 hours. Liver Enzymes No results for input(s): TP, ALB, TBIL, AP in the last 72 hours.     No lab exists for component: SGOT, GPT, DBIL   Thyroid Studies No results found for: [place], TSH, TSHEXT         Imaging:  Nabeel De Wo Cont    Result Date: 4/19/2021  1. Age-related senescent changes and minimal chronic microvascular disease without acute intracranial abnormality. CPT Code:  69688     Mri Thorac Spine W Wo Cont    Result Date: 4/19/2021  1. Scattered sclerotic marrow lesions in keeping with history of metastatic prostate cancer however without definite active metastases identified. 2. Indeterminate enhancing lesion along the left anterolateral margin of the T11/12 disc space likely related to degenerative disc disease. 3. Indeterminate solid-appearing left posterior mid renal lesion. If clinically warranted, further evaluation with contrast-enhanced CT is recommended to exclude a renal mass, or consideration for ultrasound. CPT code: [address] Spine W Wo Cont    Result Date: 4/19/2021  1) L3-4 disc bulge osteophyte complex with bilateral hypertrophic facet arthropathy and a broad-based shallow disc protrusion, central and left paracentral which creates a significant central stenosis as above. 2) Small sclerotic metastases at L2 and L5. Ct Perf W Cbf    Result Date: 4/18/2021  No CT evidence of acute perfusion abnormality. Cta Code Neuro Head And Neck W Cont    Result Date: 4/18/2021  1. Negative CTA exam of the major cervical arterial vasculature for significant stenosis or occlusion. 2. Negative CTA exam of the major intracranial arterial vasculature for significant proximal vessel stenosis, occlusion, or aneurysms. 3. Right maxillary sinus disease, as above. Ct Code Neuro Head Wo Contrast    Result Date: 4/18/2021  1. No acute intracranial abnormality. 2. Mild chronic white matter microangiopathic changes. Microbiology/Cultures:   All Micro Results     None          Discharge Exam:  Visit Vitals  /83 (BP 1 Location: Left upper arm, BP Patient Position: Lying)   Pulse 67   Temp 97.8 °F (36.6 °C)   Resp 19   Ht 5' 10\" (1.778 m)   Wt 91.2 kg (201 lb 1.6 oz)   SpO2 94%   BMI 28.05 kg/m²     General appearance: alert, cooperative, no distress, appears stated age  Lungs: clear to auscultation bilaterally  Heart: regular rate and rhythm, S1, S2 normal, no murmur, click, rub or gallop  Abdomen: soft, non-tender. Bowel sounds normal. No masses,  no organomegaly  Extremities: no cyanosis or edema  Neurologic: Grossly normal    Disposition: home  Discharge Condition: stable  Patient Instructions:   Current Discharge Medication List      START taking these medications    Details   aspirin 81 mg chewable tablet Take 1 Tab by mouth daily. Qty: 30 Tab, Refills: 2         CONTINUE these medications which have NOT CHANGED    Details   dabigatran etexilate (PRADAXA) 150 mg capsule Take 150 mg by mouth every twelve (12) hours. oxybutynin chloride XL (DITROPAN XL) 10 mg CR tablet Take 10 mg by mouth daily. empagliflozin (JARDIANCE) 25 mg tablet Take 25 mg by mouth daily. furosemide (LASIX) 40 mg tablet Take 40 mg by mouth daily. metFORMIN (GLUCOPHAGE) 500 mg tablet Take 500 mg by mouth two (2) times daily (with meals). metoprolol tartrate (LOPRESSOR) 50 mg tablet Take 50 mg by mouth two (2) times a day. enzalutamide (XTANDI) 40 mg capsule Take 160 mg by mouth daily. multivitamin, tx-iron-ca-min (THERA-M w/ IRON) 9 mg iron-400 mcg tab tablet Take 1 Tab by mouth daily. !! glucosamine/chondr degroot A sod (OSTEO BI-FLEX PO) Take  by mouth. !! glucosamine-chondroitin (Osteo Bi-Flex) 250-200 mg tab Take  by mouth. diclofenac (Voltaren) 1 % gel Apply  to affected area four (4) times daily. atorvastatin (LIPITOR) 40 mg tablet Take 40 mg by mouth daily. dilTIAZem ER (TIAZAC) 240 mg capsule Take 240 mg by mouth daily. glipiZIDE SR (GLUCOTROL XL) 2.5 mg CR tablet Take 2.5 mg by mouth daily. SITagliptin (JANUVIA) 50 mg tablet Take 50 mg by mouth daily.       levothyroxine (SYNTHROID) 200 mcg tablet Take 200 mcg by mouth Daily (before breakfast). pfff-eywj-jhx-anj-ooj-tynk-hor (Tumersaid) [phone] mg tab Take  by mouth.      cyanocobalamin (VITAMIN B12) 100 mcg tablet Take 100 mcg by mouth daily. ascorbic acid, vitamin C, (VITAMIN C) 500 mg tablet Take 500 mg by mouth. !! - Potential duplicate medications found. Please discuss with provider.       STOP taking these medications       benazepriL (LOTENSIN) 20 mg tablet Comments:   Reason for Stopping:               Activity: Activity as tolerated  Diet: Diabetic Diet  Wound Care: None needed    Follow-up  ·   PCP in one week  Time spent to discharge patient 35 minutes  Signed:  Rc Barney DO  4/21/2021  1:13 PM

## 2021-04-21 NOTE — PROGRESS NOTES
CM met with patient to discuss discharge needs. Patient denies any supportive care needs at this time. Per PT Eval this day, no further skilled therapy. CM attempted to reach the patient's daughter Aliya Villasenor [phone], to discuss and address any concerns pertaining to discharge. CM was unsuccessful at reaching daughter. VM left this day. Patient confirmed he will be transported by his brother Jovani Almeida. Patient anticipates to stay with his son Evans Burmfield in [place]. No needs voiced at this time. Please consult or notify CM if any needs shall arise. CM remains available. Care Management Interventions  PCP Verified by CM: Yes(Dr. Hoskins Mail- Daughter unsure of PCP's practice name. )  Mode of Transport at Discharge: Other (see comment)(Patient's Brother Dayna Bonilla. )  Transition of Care Consult (CM Consult): Discharge Planning  Discharge Durable Medical Equipment: Yes  Physical Therapy Consult: Yes  Occupational Therapy Consult: Yes  Speech Therapy Consult: Yes  Current Support Network: Other(Per daughter, the patient resides with friends. )  Confirm Follow Up Transport: Family(Patient no longer driving. )  The Plan for Transition of Care is Related to the Following Treatment Goals : Return to Baseline. The Patient and/or Patient Representative was Provided with a Choice of Provider and Agrees with the Discharge Plan?: Yes  Name of the Patient Representative Who was Provided with a Choice of Provider and Agrees with the Discharge Plan: Patient's daughter Aliya Villasenor.    [place] Resource Information Provided?: No  Discharge Location  Discharge Placement: Other:(Discharge to [place] with Family. )

## 2021-04-21 NOTE — PROGRESS NOTES
Hourly rounds performed. Pt has been NPO except medications since midnight. Pt denies pain or needs at this time. Pt denies urinary incontinence since admission. States incontinence was a problem at home. Pt denies dizziness. Reports no numbness or tingling to his fingers. Pt states his fingers do feel \"fat\". Pt states his vision is blurry, but this blurry vision has been an issue for a while and was not sudden onset prior to admission. Patient very upset about NPO orders this morning and wants to make sure breakfast is saved for him. Bed in low position and call light/ personal items within reach. Will continue to monitor and give bedside shift report to oncoming day shift nurse.

## 2021-04-21 NOTE — PROGRESS NOTES
ACUTE PHYSICAL THERAPY GOALS:  (Developed with and agreed upon by patient and/or caregiver. )  LTG:  (1.)Mr. Medrano will move from supine to sit and sit to supine, scoot up and down and roll side to side in bed INDEPENDENTLY with bed flat within 7 treatment day(s). (2.)Mr. Medrano will transfer from bed to chair and chair to bed INDEPENDENTLY within 7 treatment day(s). (3.)Mr. Medrano will ambulate INDEPENDENTLY for 500+ feet within 7 treatment day(s). ________________________________________________________________________________________________      PHYSICAL THERAPY ASSESSMENT: Initial Assessment and AM PT Treatment Day # 1      Deboraha Carrel is a 82 y.o. male   PRIMARY DIAGNOSIS: Lumbar spinal stenosis  Unsteady gait [R26.81]  Urinary incontinence [R32]  Spinal stenosis of lumbar region [M48.061]       Reason for Referral:  Weakness, unsteady gait  ICD-10: Treatment Diagnosis: Generalized Muscle Weakness (M62.81)  Difficulty in walking, Not elsewhere classified (R26.2)  Other abnormalities of gait and mobility (R26.89)  [place] / [place] / Product Type: Medicare /     ASSESSMENT:     REHAB RECOMMENDATIONS:   Recommendation to date pending progress:  Setting:   No further skilled therapy   Equipment:    None     PRIOR LEVEL OF FUNCTION:  (Prior to Hospitalization) INITIAL/CURRENT LEVEL OF FUNCTION:  (Most Recently Demonstrated)   Bed Mobility:   Independent  Sit to Stand:   Independent  Transfers:   Independent  Gait/Mobility:   Independent Bed Mobility:   Independent  Sit to Stand:   Supervision  Transfers:   Supervision  Gait/Mobility:  Suzie Deng Assistance     ASSESSMENT:  Mr. Brandon Landa was admitted after two acute episodes of dizziness and unsteady gait at home. He presents in supine without complaints and reports feeling at or close to baseline physically at this point. Performs bed mobility independently, supervision for sit-stand transfers.  Functional mobility and ambulation, transfers in room with CGA-supervision and cueing for slower pace and overall gait safety. Ambulatory 450 ft in hallway with CGA-SBA and no DME use. Verbal cues for slower/appropriate pace, posture, sequencing, and safety. Returned to room and up to chair after activity, positioned comfortably with needs in reach. Does appear to have some mild cognitive/memory deficits today and seems slightly confused. Unclear baseline mental status. Discussed dc planning- he plans to stay with son in [place] for a week at [employer] as his apartment is currently being renovated. From mobility standpoint he does seem safe for dc especially with 24/7 supervision. Will follow during hospital stay to maximize safety/independence. SUBJECTIVE:   Mr. Juliet Jeronimo states, \"I'm doing fine\"    SOCIAL HISTORY/LIVING ENVIRONMENT: Lives alone at baseline, however recently moved into basement apartment with his home and his friends are renting out the main level of his home. Typically independent without DME use. Denies recent falls but does have history of occasional falls.    Home Environment: Private residence  # Steps to Enter: 0  One/Two Story Residence: One story  Living Alone: No  Support Systems: Child(bernard), Family member(s)  OBJECTIVE:     PAIN: VITAL SIGNS: LINES/DRAINS:   Pre Treatment: Pain Screen  Pain Scale 1: Numeric (0 - 10)  Pain Intensity 1: 0  Post Treatment: 0/10 Vital Signs  O2 Device: None (Room air) none  O2 Device: None (Room air)     GROSS EVALUATION:   Within Functional Limits Abnormal/ Functional Abnormal/ Non-Functional (see comments) Not Tested Comments:   AROM [x] [] [] []    PROM [] [] [] []    Strength [] [x] [] []    Balance [] [x] [] []    Posture [] [x] [] []    Sensation [] [] [] []    Coordination [] [] [] []    Tone [] [] [] []    Edema [] [] [] []    Activity Tolerance [] [x] [] []     [] [] [] []      COGNITION/  PERCEPTION: Intact Impaired   (see comments) Comments:   Orientation [x] [] Vision [x] []    Hearing [x] []    Command Following [x] []    Safety Awareness [] [x]     [] []      MOBILITY: I Mod I S SBA CGA Min Mod Max Total  NT x2 Comments:   Bed Mobility    Rolling [x] [] [] [] [] [] [] [] [] [] []    Supine to Sit [x] [] [] [] [] [] [] [] [] [] []    Scooting [x] [] [] [] [] [] [] [] [] [] []    Sit to Supine [] [] [] [] [] [] [] [] [] [] []    Transfers    Sit to Stand [] [] [x] [] [] [] [] [] [] [] []    Bed to Chair [] [] [x] [] [] [] [] [] [] [] []    Stand to Sit [] [] [x] [] [] [] [] [] [] [] []    I=Independent, Mod I=Modified Independent, S=Supervision, SBA=Standby Assistance, CGA=Contact Guard Assistance,   Min=Minimal Assistance, Mod=Moderate Assistance, Max=Maximal Assistance, Total=Total Assistance, NT=Not Tested  GAIT: I Mod I S SBA CGA Min Mod Max Total  NT x2 Comments:   Level of Assistance [] [] [x] [x] [] [] [] [] [] [] []    Distance 450 ft    DME None    Gait Quality Mild trunk sway, path deviations    Weightbearing Status N/A     I=Independent, Mod I=Modified Independent, S=Supervision, SBA=Standby Assistance, CGA=Contact Guard Assistance,   Min=Minimal Assistance, Mod=Moderate Assistance, Max=Maximal Assistance, Total=Total Assistance, NT=Not Tested    [employer] AM-PAC 6 Clicks   Basic Mobility Inpatient Short Form       How much difficulty does the patient currently have. .. Unable A Lot A Little None   1. Turning over in bed (including adjusting bedclothes, sheets and blankets)? [] 1   [] 2   [] 3   [x] 4   2. Sitting down on and standing up from a chair with arms ( e.g., wheelchair, bedside commode, etc.)   [] 1   [] 2   [] 3   [x] 4   3. Moving from lying on back to sitting on the side of the bed? [] 1   [] 2   [] 3   [x] 4   How much help from another person does the patient currently need. .. Total A Lot A Little None   4. Moving to and from a bed to a chair (including a wheelchair)? [] 1   [] 2   [x] 3   [] 4   5.   Need to walk in hospital room?   [] 1   [] 2   [x] 3   [] 4   6. Climbing 3-5 steps with a railing? [] 1   [] 2   [x] 3   [] 4   © 2007, Trustees of [facility] [employer], under license to [employer]. All rights reserved     Score:  Initial: 21 Most Recent: X (Date: -- )    Interpretation of Tool:  Represents activities that are increasingly more difficult (i.e. Bed mobility, Transfers, Gait). PLAN:   FREQUENCY/DURATION: PT Plan of Care: 3 times/week for duration of hospital stay or until stated goals are met, whichever comes first.    PROBLEM LIST:   (Skilled intervention is medically necessary to address:)  1. Decreased Activity Tolerance  2. Decreased Balance  3. Decreased Gait Ability  4. Decreased Strength  5. Decreased Transfer Abilities   INTERVENTIONS PLANNED:   (Benefits and precautions of physical therapy have been discussed with the patient.)  1. Therapeutic Activity  2. Therapeutic Exercise/HEP  3. Neuromuscular Re-education  4. Gait Training  5. Manual Therapy  6. Education     TREATMENT:     EVALUATION: Low Complexity : (Untimed Charge)    TREATMENT:   ($$ Therapeutic Activity: 8-22 mins    )  Co-Treatment PT/OT necessary due to patient's decreased overall endurance/tolerance levels, as well as need for high level skilled assistance to complete functional transfers/mobility and functional tasks  Therapeutic Activity (10 Minutes): Therapeutic activity included Rolling, Supine to Sit, Scooting, Transfer Training, Ambulation on level ground, Sitting balance  and Standing balance to improve functional Mobility, Strength, Activity tolerance and balance.     TREATMENT GRID:  N/A    AFTER TREATMENT POSITION/PRECAUTIONS:  Chair, Needs within reach and RN notified    INTERDISCIPLINARY COLLABORATION:  RN/PCT, PT/PTA and OT/MANN    TOTAL TREATMENT DURATION:  PT Patient Time In/Time Out  Time In: 1103  Time Out: B[address], DPT

## 2021-09-02 ENCOUNTER — APPOINTMENT (OUTPATIENT)
Dept: GENERAL RADIOLOGY | Age: 86
End: 2021-09-02
Attending: EMERGENCY MEDICINE
Payer: MEDICARE

## 2021-09-02 ENCOUNTER — HOSPITAL ENCOUNTER (EMERGENCY)
Age: 86
Discharge: HOME | End: 2021-09-02
Attending: EMERGENCY MEDICINE
Payer: MEDICARE

## 2021-09-02 VITALS
RESPIRATION RATE: 17 BRPM | SYSTOLIC BLOOD PRESSURE: 152 MMHG | HEART RATE: 73 BPM | HEIGHT: 71 IN | TEMPERATURE: 98 F | BODY MASS INDEX: 28 KG/M2 | WEIGHT: 200 LBS | DIASTOLIC BLOOD PRESSURE: 83 MMHG | OXYGEN SATURATION: 99 %

## 2021-09-02 DIAGNOSIS — R53.83 MALAISE AND FATIGUE: ICD-10-CM

## 2021-09-02 DIAGNOSIS — R53.81 MALAISE AND FATIGUE: ICD-10-CM

## 2021-09-02 DIAGNOSIS — R06.7 SNEEZING: Primary | ICD-10-CM

## 2021-09-02 LAB
ALBUMIN SERPL-MCNC: 3.7 G/DL (ref 3.2–4.6)
ALBUMIN/GLOB SERPL: 1 {RATIO} (ref 1.2–3.5)
ALP SERPL-CCNC: 70 U/L (ref 50–136)
ALT SERPL-CCNC: 53 U/L (ref 12–65)
ANION GAP SERPL CALC-SCNC: 4 MMOL/L (ref 7–16)
AST SERPL-CCNC: 48 U/L (ref 15–37)
ATRIAL RATE: 241 BPM
BASOPHILS # BLD: 0 K/UL (ref 0–0.2)
BASOPHILS NFR BLD: 1 % (ref 0–2)
BILIRUB SERPL-MCNC: 0.4 MG/DL (ref 0.2–1.1)
BUN SERPL-MCNC: 26 MG/DL (ref 8–23)
CALCIUM SERPL-MCNC: 9.3 MG/DL (ref 8.3–10.4)
CALCULATED R AXIS, ECG10: -47 DEGREES
CALCULATED T AXIS, ECG11: 36 DEGREES
CHLORIDE SERPL-SCNC: 110 MMOL/L (ref 98–107)
CO2 SERPL-SCNC: 25 MMOL/L (ref 21–32)
COVID-19 RAPID TEST, COVR: NOT DETECTED
CREAT SERPL-MCNC: 1.21 MG/DL (ref 0.8–1.5)
DIAGNOSIS, 93000: NORMAL
DIFFERENTIAL METHOD BLD: ABNORMAL
EOSINOPHIL # BLD: 0.1 K/UL (ref 0–0.8)
EOSINOPHIL NFR BLD: 3 % (ref 0.5–7.8)
ERYTHROCYTE [DISTWIDTH] IN BLOOD BY AUTOMATED COUNT: 13.6 % (ref 11.9–14.6)
GLOBULIN SER CALC-MCNC: 3.8 G/DL (ref 2.3–3.5)
GLUCOSE SERPL-MCNC: 158 MG/DL (ref 65–100)
HCT VFR BLD AUTO: 40.5 % (ref 41.1–50.3)
HGB BLD-MCNC: 13.6 G/DL (ref 13.6–17.2)
IMM GRANULOCYTES # BLD AUTO: 0 K/UL (ref 0–0.5)
IMM GRANULOCYTES NFR BLD AUTO: 1 % (ref 0–5)
LYMPHOCYTES # BLD: 1 K/UL (ref 0.5–4.6)
LYMPHOCYTES NFR BLD: 25 % (ref 13–44)
MAGNESIUM SERPL-MCNC: 2.1 MG/DL (ref 1.8–2.4)
MCH RBC QN AUTO: 31.7 PG (ref 26.1–32.9)
MCHC RBC AUTO-ENTMCNC: 33.6 G/DL (ref 31.4–35)
MCV RBC AUTO: 94.4 FL (ref 79.6–97.8)
MONOCYTES # BLD: 0.6 K/UL (ref 0.1–1.3)
MONOCYTES NFR BLD: 15 % (ref 4–12)
NEUTS SEG # BLD: 2.3 K/UL (ref 1.7–8.2)
NEUTS SEG NFR BLD: 56 % (ref 43–78)
NRBC # BLD: 0 K/UL (ref 0–0.2)
PLATELET # BLD AUTO: 186 K/UL (ref 150–450)
PMV BLD AUTO: 9.3 FL (ref 9.4–12.3)
POTASSIUM SERPL-SCNC: 4 MMOL/L (ref 3.5–5.1)
PROT SERPL-MCNC: 7.5 G/DL (ref 6.3–8.2)
Q-T INTERVAL, ECG07: 410 MS
QRS DURATION, ECG06: 126 MS
QTC CALCULATION (BEZET), ECG08: 439 MS
RBC # BLD AUTO: 4.29 M/UL (ref 4.23–5.6)
SODIUM SERPL-SCNC: 139 MMOL/L (ref 138–145)
SOURCE, COVRS: NORMAL
VENTRICULAR RATE, ECG03: 69 BPM
WBC # BLD AUTO: 4.1 K/UL (ref 4.3–11.1)

## 2021-09-02 PROCEDURE — 71046 X-RAY EXAM CHEST 2 VIEWS: CPT

## 2021-09-02 PROCEDURE — 93005 ELECTROCARDIOGRAM TRACING: CPT | Performed by: EMERGENCY MEDICINE

## 2021-09-02 PROCEDURE — 83735 ASSAY OF MAGNESIUM: CPT

## 2021-09-02 PROCEDURE — 71045 X-RAY EXAM CHEST 1 VIEW: CPT

## 2021-09-02 PROCEDURE — 99285 EMERGENCY DEPT VISIT HI MDM: CPT

## 2021-09-02 PROCEDURE — 85025 COMPLETE CBC W/AUTO DIFF WBC: CPT

## 2021-09-02 PROCEDURE — 80053 COMPREHEN METABOLIC PANEL: CPT

## 2021-09-02 PROCEDURE — 87635 SARS-COV-2 COVID-19 AMP PRB: CPT

## 2021-09-02 RX ORDER — SODIUM CHLORIDE 0.9 % (FLUSH) 0.9 %
5-10 SYRINGE (ML) INJECTION AS NEEDED
Status: DISCONTINUED | OUTPATIENT
Start: 2021-09-02 | End: 2021-09-02 | Stop reason: HOSPADM

## 2021-09-02 RX ORDER — SODIUM CHLORIDE 0.9 % (FLUSH) 0.9 %
5-10 SYRINGE (ML) INJECTION EVERY 8 HOURS
Status: DISCONTINUED | OUTPATIENT
Start: 2021-09-02 | End: 2021-09-02 | Stop reason: HOSPADM

## 2021-09-02 NOTE — ED PROVIDER NOTES
79-year-old male presents with concerns for COVID-19. He was exposed to a friend with Covid 6 days ago. Daughter reports he appeared short of breath over the past 4 days however patient denies feeling short of breath. He reports generalized weakness and frequent sneezing. He denies cough, chest pain, fevers. Denies overtaking any medications. Daughter was concerned that his heart rate was in the 40s yesterday and sats were 88 to 92%. Patient has had Covid vaccine. Also reports feeling confused at times, but this does not appear to be new. Past Medical History:   Diagnosis Date    Cancer [facility])     prostate, bones, thyroid    [facility]est[facility])     Diabetes ([facility] [address]        Past Surgical History:   Procedure Laterality Date    HX HIP REPLACEMENT Right     HX THYROIDECTOMY           No family history on file. Social History     Socioeconomic History    Marital status:      Spouse name: Not on file    Number of children: Not on file    Years of education: Not on file    Highest education level: Not on file   Occupational History    Not on file   Tobacco Use    Smoking status: Former Smoker    Smokeless tobacco: Never Used   Substance and Sexual Activity    Alcohol use: Yes    Drug use: Never    Sexual activity: Not on file   Other Topics Concern    Not on file   Social History Narrative    Not on file     Social Determinants of Health     Financial Resource Strain:     Difficulty of Paying Living Expenses:    Food Insecurity:     Worried About Running Out of Food in the Last Year:     [address] in the Last Year:    Transportation Needs:     Lack of Transportation (Medical):      Lack of Transportation (Non-Medical):    Physical Activity:     Days of Exercise per Week:     Minutes of Exercise per Session:    Stress:     Feeling of Stress :    Social Connections:     Frequency of Communication with Friends and Family:     Frequency of Social Gatherings with Friends and Family:     Attends Mandaeism Services:     Active Member of Clubs or Organizations:     Attends Club or Organization Meetings:     Marital Status:    Intimate Partner Violence:     Fear of Current or Ex-Partner:     Emotionally Abused:     Physically Abused:     Sexually Abused: ALLERGIES: Patient has no known allergies. Review of Systems   Constitutional: Positive for fatigue. Negative for fever. HENT: Positive for sneezing. Negative for hearing loss. Eyes: Negative for visual disturbance. Respiratory: Negative for cough and shortness of breath. Cardiovascular: Negative for chest pain. Gastrointestinal: Negative for abdominal pain, diarrhea, nausea and vomiting. Musculoskeletal: Negative for back pain. Skin: Negative for rash. Neurological: Positive for weakness. Negative for headaches. Psychiatric/Behavioral: Positive for confusion. All other systems reviewed and are negative. Vitals:    09/02/21 1007   BP: (!) 162/115   Pulse: 64   Resp: 18   Temp: 98 °F (36.7 °C)   SpO2: 95%   Weight: 90.7 kg (200 lb)   Height: 5' 11\" (1.803 m)            Physical Exam  Vitals and nursing note reviewed. Constitutional:       Appearance: Normal appearance. He is well-developed. HENT:      Head: Normocephalic and atraumatic. Nose: Nose normal.      Mouth/Throat:      Mouth: Mucous membranes are moist.   Eyes:      Pupils: Pupils are equal, round, and reactive to light. Cardiovascular:      Rate and Rhythm: Rhythm irregular. Heart sounds: Normal heart sounds. Pulmonary:      Effort: Pulmonary effort is normal.      Breath sounds: Normal breath sounds. Abdominal:      Palpations: Abdomen is soft. Tenderness: There is no abdominal tenderness. Musculoskeletal:         General: No deformity. Normal range of motion. Cervical back: Normal range of motion and neck supple. Skin:     General: Skin is warm and dry.    Neurological: General: No focal deficit present. Mental Status: He is alert. Mental status is at baseline. Psychiatric:         Mood and Affect: Mood normal.         Behavior: Behavior normal.          MDM  Number of Diagnoses or Management Options  Diagnosis management comments: Parts of this document were created using dragon voice recognition software. The chart has been reviewed but errors may still be present. I wore appropriate PPE throughout this patient's ED visit. Marcial Bernheim, MD, 10:23 AM    12:00 PM  Repeat chest x-ray clear. Covid negative. No hypoxia. Stable vital signs. 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 Will discharge. I discussed the results of all labs, procedures, radiographs, and treatments with the patient and available family. Treatment plan is agreed upon and the patient is ready for discharge. Questions about treatment in the ED and differential diagnosis of presenting condition were answered. Patient was given verbal discharge instructions including, but not limited to, importance of returning to the emergency department for any concern of worsening or continued symptoms. Instructions were given to follow up with a primary care provider or specialist within 1-2 days. Adverse effects of medications, if prescribed, were discussed and patient was advised to refrain from significant physical activity until followed up by primary care physician and to not drive or operate heavy machinery after taking any sedating substances.            Amount and/or Complexity of Data Reviewed  Clinical lab tests: ordered and reviewed (Results for orders placed or performed during the hospital encounter of 09/02/21  -COVID-19 RAPID TEST       Result                      Value             Ref Range           Specimen source             NASAL                                 COVID-19 rapid test         Not detected      NOTD           -CBC WITH AUTOMATED DIFF       Result                      Value Ref Range           WBC                         4.1 (L)           4.3 - 11.1 K*       RBC                         4.29              4.23 - 5.6 M*       HGB                         13.6              13.6 - 17.2 *       HCT                         40.5 (L)          41.1 - 50.3 %       MCV                         94.4              79.6 - 97.8 *       MCH                         31.7              26.1 - 32.9 *       MCHC                        33.6              31.4 - 35.0 *       RDW                         13.6              11.9 - 14.6 %       PLATELET                    186               150 - 450 K/*       MPV                         9.3 (L)           9.4 - 12.3 FL       ABSOLUTE NRBC               0.00              0.0 - 0.2 K/*       DF                          AUTOMATED                             NEUTROPHILS                 56                43 - 78 %           LYMPHOCYTES                 25                13 - 44 %           MONOCYTES                   15 (H)            4.0 - 12.0 %        EOSINOPHILS                 3                 0.5 - 7.8 %         BASOPHILS                   1                 0.0 - 2.0 %         IMMATURE GRANULOCYTES       1                 0.0 - 5.0 %         ABS. NEUTROPHILS            2.3               1.7 - 8.2 K/*       ABS. LYMPHOCYTES            1.0               0.5 - 4.6 K/*       ABS. MONOCYTES              0.6               0.1 - 1.3 K/*       ABS. EOSINOPHILS            0.1               0.0 - 0.8 K/*       ABS. BASOPHILS              0.0               0.0 - 0.2 K/*       ABS. IMM.  GRANS.            0.0               0.0 - 0.5 K/*  -METABOLIC PANEL, COMPREHENSIVE       Result                      Value             Ref Range           Sodium                      139               138 - 145 mm*       Potassium                   4.0               3.5 - 5.1 mm*       Chloride                    110 (H)           98 - 107 mmo*       CO2                         25 21 - 32 mmol*       Anion gap                   4 (L)             7 - 16 mmol/L       Glucose                     158 (H)           65 - 100 mg/*       BUN                         26 (H)            8 - 23 MG/DL        Creatinine                  1.21              0.8 - 1.5 MG*       GFR est AA                  >60               >60 ml/min/1*       GFR est non-AA              >60               >60 ml/min/1*       Calcium                     9.3               8.3 - 10.4 M*       Bilirubin, total            0.4               0.2 - 1.1 MG*       ALT (SGPT)                  53                12 - 65 U/L         AST (SGOT)                  48 (H)            15 - 37 U/L         Alk. phosphatase            70                50 - 136 U/L        Protein, total              7.5               6.3 - 8.2 g/*       Albumin                     3.7               3.2 - 4.6 g/*       Globulin                    3.8 (H)           2.3 - 3.5 g/*       A-G Ratio                   1.0 (L)           1.2 - 3.5      -MAGNESIUM       Result                      Value             Ref Range           Magnesium                   2.1               1.8 - 2.4 mg*  -EKG, 12 LEAD, INITIAL       Result                      Value             Ref Range           Ventricular Rate            69                BPM                 Atrial Rate                 241               BPM                 QRS Duration                126               ms                  Q-T Interval                410               ms                  QTC Calculation (Bezet)     439               ms                  Calculated R Axis           -47               degrees             Calculated T Axis           36                degrees             Diagnosis                                                     !! AGE AND GENDER SPECIFIC ECG ANALYSIS !!    Wide QRS rhythm with frequent Premature ventricular complexes   Right bundle branch block   Left anterior fascicular block   !!! Bifascicular block !!! Abnormal ECG   When compared with ECG of 02-SEP-2021 10:10,   Wide QRS rhythm has replaced Atrial fibrillation     )  Tests in the radiology section of CPT®: ordered and reviewed (XR CHEST PA LAT    Result Date: 9/2/2021  Two view chest History: Abnormal portable chest x-ray, negative Covid test. Comparison: Earlier on the same day Findings: The heart is mildly enlarged. The lungs and pleural spaces are clear. Previously noted abnormality within the left midlung zone does not persist and was felt to be artifactual. The pulmonary vascularity is within normal limits. The visualized osseous structures are unremarkable. Mild cardiomegaly with clear lungs. XR CHEST PORT    Result Date: 9/2/2021  Portable chest: History: Pt arrives with complaints of low oxygen sats at home and \"I have been confused at times\". Pt states he is vaccinated. Friend was recently dx with COVID. Comparison: None Findings: A single view of the chest was obtained at 10:16 AM. The cardiac silhouette borderline enlarged. There is asymmetric increased hazy opacities within the left midlung zone. This may be artifactual due to superimposition of soft tissue and osseous structures however an underlying parenchymal process cannot be excluded. There are no pleural effusions. Possible hazy left midlung zone parenchymal opacities. A dedicated two-view chest series would be recommended for further assessment. Toño Chavez           EKG    Date/Time: 9/2/2021 10:20 AM  Performed by: Maury Gorman MD  Authorized by: Maury Gorman MD     ECG reviewed by ED Physician in the absence of a cardiologist: yes    Interpretation:     Interpretation: abnormal    Rate:     ECG rate:  69    ECG rate assessment: normal    Rhythm:     Rhythm: sinus rhythm    Ectopy:     Ectopy: PVCs      PVCs:  Frequent  Conduction:     Conduction: abnormal      Abnormal conduction: bifascicular block    ST segments:     ST segments:  Non-specific  T waves:     T waves: non-specific

## 2021-09-02 NOTE — ED NOTES
I have reviewed discharge instructions with the patient. The patient verbalized understanding. Patient left ED via Discharge Method: wheelchair to Home with daughter  Opportunity for questions and clarification provided. Patient given 0 scripts. To continue your aftercare when you leave the hospital, you may receive an automated call from our care team to check in on how you are doing. This is a free service and part of our promise to provide the best care and service to meet your aftercare needs.  If you have questions, or wish to unsubscribe from this service please call [phone]. Thank you for Choosing our [address] [place] [address] Emergency Department.

## 2021-11-09 ENCOUNTER — HOSPITAL ENCOUNTER (EMERGENCY)
Age: 86
Discharge: HOME | End: 2021-11-09
Attending: STUDENT IN AN ORGANIZED HEALTH CARE EDUCATION/TRAINING PROGRAM
Payer: COMMERCIAL

## 2021-11-09 ENCOUNTER — APPOINTMENT (OUTPATIENT)
Dept: GENERAL RADIOLOGY | Age: 86
End: 2021-11-09
Attending: PHYSICIAN ASSISTANT
Payer: COMMERCIAL

## 2021-11-09 VITALS
OXYGEN SATURATION: 96 % | RESPIRATION RATE: 18 BRPM | SYSTOLIC BLOOD PRESSURE: 123 MMHG | HEART RATE: 91 BPM | HEIGHT: 71 IN | BODY MASS INDEX: 29.4 KG/M2 | DIASTOLIC BLOOD PRESSURE: 76 MMHG | WEIGHT: 210 LBS

## 2021-11-09 DIAGNOSIS — M25.461 EFFUSION OF RIGHT KNEE: ICD-10-CM

## 2021-11-09 DIAGNOSIS — S83.91XA SPRAIN OF RIGHT KNEE, UNSPECIFIED LIGAMENT, INITIAL ENCOUNTER: Primary | ICD-10-CM

## 2021-11-09 PROCEDURE — 73562 X-RAY EXAM OF KNEE 3: CPT

## 2021-11-09 PROCEDURE — 74011250637 HC RX REV CODE- 250/637: Performed by: PHYSICIAN ASSISTANT

## 2021-11-09 PROCEDURE — 71046 X-RAY EXAM CHEST 2 VIEWS: CPT

## 2021-11-09 PROCEDURE — 99283 EMERGENCY DEPT VISIT LOW MDM: CPT

## 2021-11-09 RX ORDER — HYDROCODONE BITARTRATE AND ACETAMINOPHEN 5; 325 MG/1; MG/1
1 TABLET ORAL ONCE
Status: COMPLETED | OUTPATIENT
Start: 2021-11-09 | End: 2021-11-09

## 2021-11-09 RX ORDER — HYDROCODONE BITARTRATE AND ACETAMINOPHEN 5; 325 MG/1; MG/1
1 TABLET ORAL
Qty: 10 TABLET | Refills: 0 | Status: SHIPPED | OUTPATIENT
Start: 2021-11-09 | End: 2021-11-12

## 2021-11-09 RX ADMIN — HYDROCODONE BITARTRATE AND ACETAMINOPHEN 1 TABLET: 5; 325 TABLET ORAL at 13:28

## 2021-11-09 NOTE — ED NOTES
I have reviewed discharge instructions with the patient. The patient verbalized understanding. Patient left ED via Discharge Method: ambulatory to Home    Opportunity for questions and clarification provided. Patient given 1 scripts. To continue your aftercare when you leave the hospital, you may receive an automated call from our care team to check in on how you are doing.  This is a free service and part of our promise to provide the best care and service to meet your aftercare needs. \" If you have questions, or wish to unsubscribe from this service please call [phone].  Thank you for Choosing our [facility] Emergency Department.

## 2021-11-09 NOTE — ED PROVIDER NOTES
Is an 57-year-old male who presents with daughter for the complaint of right knee pain and swelling. Over the weekend he was blowing the leaves when he tripped and fell injuring the right knee. He states that that did not hurt that bad at the time and he was able to perform his normal daily exercises which consist of riding on his stationary bike. However the next morning when he woke up it was severely stiff and swollen and he could barely put weight on it. He refused to go to the hospital to have it evaluated but this morning he had a hard time getting out of bed and states he hardly slept because of this discomfort so he called his daughter and she brought him in. He states he has no pain when he is sitting and not moving his knee, however movement and bearing weight makes it worse. No previous injury to the area. He denies any head injury in the initial fall. The history is provided by the patient and a relative. Fall  Pertinent negatives include no chest pain, no abdominal pain and no shortness of breath. Past Medical History:   Diagnosis Date    Cancer [facility])     prostate, bones, thyroid    [facility]est[facility] [facility]     Diabetes ([facility] [address] [facility]        Past Surgical History:   Procedure Laterality Date    HX HIP REPLACEMENT Right     HX THYROIDECTOMY           History reviewed. No pertinent family history. Social History     Socioeconomic History    Marital status:      Spouse name: Not on file    Number of children: Not on file    Years of education: Not on file    Highest education level: Not on file   Occupational History    Not on file   Tobacco Use    Smoking status: Former Smoker    Smokeless tobacco: Never Used   Substance and Sexual Activity    Alcohol use:  Yes    Drug use: Never    Sexual activity: Not on file   Other Topics Concern    Not on file   Social History Narrative    Not on file     Social Determinants of Health     Financial Resource Strain:    Corey Difficulty of Paying Living Expenses: Not on file   Food Insecurity:     Worried About Running Out of Food in the Last Year: Not on file    Ran Out of Food in the Last Year: Not on file   Transportation Needs:     Lack of Transportation (Medical): Not on file    Lack of Transportation (Non-Medical): Not on file   Physical Activity:     Days of Exercise per Week: Not on file    Minutes of Exercise per Session: Not on file   Stress:     Feeling of Stress : Not on file   Social Connections:     Frequency of Communication with Friends and Family: Not on file    Frequency of Social Gatherings with Friends and Family: Not on file    Attends Yarsanism Services: Not on file    Active Member of [address] [place] [address] or Organizations: Not on file    Attends Club or Organization Meetings: Not on file    Marital Status: Not on file   Intimate Partner Violence:     Fear of Current or Ex-Partner: Not on file    Emotionally Abused: Not on file    Physically Abused: Not on file    Sexually Abused: Not on file   Housing Stability:     Unable to Pay for Housing in the Last Year: Not on file    Number of [place] in the Last Year: Not on file    Unstable Housing in the Last Year: Not on file         ALLERGIES: Patient has no known allergies. Review of Systems   Constitutional: Negative for chills and fever. HENT: Negative for sore throat. Respiratory: Negative for cough and shortness of breath. Cardiovascular: Negative for chest pain. Gastrointestinal: Negative for abdominal pain, nausea and vomiting. Musculoskeletal: Positive for arthralgias and joint swelling. Negative for back pain. Right knee pain   Neurological: Negative for dizziness, weakness and numbness. Psychiatric/Behavioral: Negative for agitation and confusion.        Vitals:    11/09/21 1218   BP: 123/76   Pulse: 91   Resp: 18   SpO2: 96%   Weight: 95.3 kg (210 lb)   Height: 5' 10.5\" (1.791 m)            Physical Exam  Vitals and nursing note reviewed. Constitutional:       Appearance: He is not ill-appearing. HENT:      Head: Normocephalic and atraumatic. Cardiovascular:      Rate and Rhythm: Normal rate. Pulses: Normal pulses. Musculoskeletal:      Right hip: Normal.      Right knee: Swelling and effusion present. No erythema or ecchymosis. Normal range of motion. Tenderness present. Right ankle: Normal.   Neurological:      Mental Status: He is alert and oriented to person, place, and time. Psychiatric:         Mood and Affect: Mood normal.         Behavior: Behavior normal.         Thought Content: Thought content normal.         Judgment: Judgment normal.          MDM  Number of Diagnoses or Management Options  Effusion of right knee  Sprain of right knee, unspecified ligament, initial encounter  Diagnosis management comments: Patient is an 61-year-old male presenting with right knee pain x3 or 4 days after fall 3 days ago while blowing leaves. Brought in today by daughter because he hardly slept last night because of the pain in his knee. He denies any head injury with the fall and he rates the pain in his knee is 7 out of 10. Pain improves with being still but hurts with movement of the knee and bearing weight. XR right knee shows: 1. No acute osseous abnormality. 2. 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 3. Moderate joint effusion. All results discussed with patient and daughter at bedside. He was placed in knee immobilizer and was provided with prescription for walker to prevent further injury. Will DC with [facility] for pain. He was given information for close follow-up with Orthopedics for further evaluation and treatment of the right knee. Advised him to rest, ice and compress the area. Discussed reasons to return to the ER. All agreeable to plan.          Procedures

## 2021-11-30 NOTE — ED TRIAGE NOTES
Pt arrives with complaints of low oxygen sats at home and \"I have been confused at times\". Pt states he is vaccinated. Friend was recently dx with COVID. Family reports \"pt was gasping for breath\".
no